# Patient Record
Sex: FEMALE | Race: OTHER | HISPANIC OR LATINO
[De-identification: names, ages, dates, MRNs, and addresses within clinical notes are randomized per-mention and may not be internally consistent; named-entity substitution may affect disease eponyms.]

---

## 2018-12-19 PROBLEM — Z00.00 ENCOUNTER FOR PREVENTIVE HEALTH EXAMINATION: Status: ACTIVE | Noted: 2018-12-19

## 2019-02-27 ENCOUNTER — RX RENEWAL (OUTPATIENT)
Age: 79
End: 2019-02-27

## 2019-03-01 ENCOUNTER — RX RENEWAL (OUTPATIENT)
Age: 79
End: 2019-03-01

## 2019-03-01 RX ORDER — CILOSTAZOL 100 MG/1
100 TABLET ORAL TWICE DAILY
Qty: 180 | Refills: 3 | Status: ACTIVE | COMMUNITY
Start: 2019-03-01 | End: 1900-01-01

## 2019-05-09 ENCOUNTER — APPOINTMENT (OUTPATIENT)
Dept: GERIATRICS | Facility: CLINIC | Age: 79
End: 2019-05-09

## 2021-10-06 ENCOUNTER — NON-APPOINTMENT (OUTPATIENT)
Age: 81
End: 2021-10-06

## 2021-10-06 ENCOUNTER — APPOINTMENT (OUTPATIENT)
Dept: BREAST CENTER | Facility: CLINIC | Age: 81
End: 2021-10-06
Payer: MEDICARE

## 2021-10-06 VITALS — OXYGEN SATURATION: 98 % | WEIGHT: 210 LBS | BODY MASS INDEX: 41.23 KG/M2 | HEIGHT: 60 IN | HEART RATE: 103 BPM

## 2021-10-06 DIAGNOSIS — Z78.9 OTHER SPECIFIED HEALTH STATUS: ICD-10-CM

## 2021-10-06 DIAGNOSIS — F17.200 NICOTINE DEPENDENCE, UNSPECIFIED, UNCOMPLICATED: ICD-10-CM

## 2021-10-06 DIAGNOSIS — Z86.79 PERSONAL HISTORY OF OTHER DISEASES OF THE CIRCULATORY SYSTEM: ICD-10-CM

## 2021-10-06 DIAGNOSIS — Z86.39 PERSONAL HISTORY OF OTHER ENDOCRINE, NUTRITIONAL AND METABOLIC DISEASE: ICD-10-CM

## 2021-10-06 DIAGNOSIS — R92.8 OTHER ABNORMAL AND INCONCLUSIVE FINDINGS ON DIAGNOSTIC IMAGING OF BREAST: ICD-10-CM

## 2021-10-06 DIAGNOSIS — Z87.39 PERSONAL HISTORY OF OTHER DISEASES OF THE MUSCULOSKELETAL SYSTEM AND CONNECTIVE TISSUE: ICD-10-CM

## 2021-10-06 DIAGNOSIS — Z80.6 FAMILY HISTORY OF LEUKEMIA: ICD-10-CM

## 2021-10-06 PROCEDURE — 99204 OFFICE O/P NEW MOD 45 MIN: CPT

## 2021-10-06 RX ORDER — AMLODIPINE BESYLATE 5 MG/1
TABLET ORAL
Refills: 0 | Status: ACTIVE | COMMUNITY

## 2021-10-06 RX ORDER — SPIRONOLACTONE 50 MG/1
TABLET ORAL
Refills: 0 | Status: ACTIVE | COMMUNITY

## 2021-10-06 RX ORDER — CILOSTAZOL 100 MG/1
100 TABLET ORAL TWICE DAILY
Qty: 180 | Refills: 3 | Status: DISCONTINUED | COMMUNITY
Start: 2019-02-27 | End: 2021-10-06

## 2021-10-06 RX ORDER — MULTIVIT-MIN/IRON/FOLIC ACID/K 18-600-40
CAPSULE ORAL
Refills: 0 | Status: ACTIVE | COMMUNITY

## 2021-10-06 RX ORDER — METOPROLOL SUCCINATE 200 MG/1
TABLET, EXTENDED RELEASE ORAL
Refills: 0 | Status: ACTIVE | COMMUNITY

## 2021-10-08 NOTE — PAST MEDICAL HISTORY
[Menarche Age ____] : age at menarche was [unfilled] [Menopause Age____] : age at menopause was [unfilled] [Approximately ___] : the LMP was approximately [unfilled] [Total Preg ___] : G[unfilled] [Live Births ___] : P[unfilled]  [Age At Live Birth ___] : Age at live birth: [unfilled] [History of Hormone Replacement Treatment] : has no history of hormone replacement treatment

## 2021-10-08 NOTE — HISTORY OF PRESENT ILLNESS
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 Right ultrasound-guided core biopsy revealed poorly differentiated ductal carcinoma that is ER/MI positive HER-2 negative with a Ki-67 of 90%. Patient denies any breast masses or nipple discharge. Her paternal grandmother breast cancer at age 55. An excisional biopsy in the left breast in 1995 that was benign. Patient has never taken hormone replacement therapy.

## 2021-10-18 ENCOUNTER — RESULT REVIEW (OUTPATIENT)
Age: 81
End: 2021-10-18

## 2021-10-20 ENCOUNTER — RESULT REVIEW (OUTPATIENT)
Age: 81
End: 2021-10-20

## 2021-10-21 ENCOUNTER — RESULT REVIEW (OUTPATIENT)
Age: 81
End: 2021-10-21

## 2021-10-21 ENCOUNTER — APPOINTMENT (OUTPATIENT)
Dept: BREAST CENTER | Facility: HOSPITAL | Age: 81
End: 2021-10-21

## 2021-10-29 ENCOUNTER — APPOINTMENT (OUTPATIENT)
Dept: BREAST CENTER | Facility: CLINIC | Age: 81
End: 2021-10-29
Payer: MEDICARE

## 2021-10-29 ENCOUNTER — RESULT REVIEW (OUTPATIENT)
Age: 81
End: 2021-10-29

## 2021-10-29 ENCOUNTER — APPOINTMENT (OUTPATIENT)
Dept: HEMATOLOGY ONCOLOGY | Facility: CLINIC | Age: 81
End: 2021-10-29
Payer: MEDICARE

## 2021-10-29 VITALS
DIASTOLIC BLOOD PRESSURE: 90 MMHG | SYSTOLIC BLOOD PRESSURE: 160 MMHG | TEMPERATURE: 97.5 F | HEART RATE: 97 BPM | BODY MASS INDEX: 41.03 KG/M2 | RESPIRATION RATE: 16 BRPM | HEIGHT: 60 IN | OXYGEN SATURATION: 96 % | WEIGHT: 209 LBS

## 2021-10-29 DIAGNOSIS — Z80.41 FAMILY HISTORY OF MALIGNANT NEOPLASM OF OVARY: ICD-10-CM

## 2021-10-29 DIAGNOSIS — Z80.3 FAMILY HISTORY OF MALIGNANT NEOPLASM OF BREAST: ICD-10-CM

## 2021-10-29 PROCEDURE — 99205 OFFICE O/P NEW HI 60 MIN: CPT | Mod: 25

## 2021-10-29 PROCEDURE — 99024 POSTOP FOLLOW-UP VISIT: CPT

## 2021-10-29 PROCEDURE — 36415 COLL VENOUS BLD VENIPUNCTURE: CPT

## 2021-10-29 RX ORDER — UBIDECARENONE 30 MG
CAPSULE ORAL
Refills: 0 | Status: ACTIVE | COMMUNITY
Start: 2021-10-29

## 2021-10-29 RX ORDER — FLUTICASONE PROPIONATE 44 UG/1
44 AEROSOL, METERED RESPIRATORY (INHALATION)
Refills: 0 | Status: ACTIVE | COMMUNITY
Start: 2021-10-29

## 2021-10-29 RX ORDER — ATORVASTATIN CALCIUM 20 MG/1
20 TABLET, FILM COATED ORAL
Refills: 0 | Status: ACTIVE | COMMUNITY
Start: 2021-10-29

## 2021-10-29 RX ORDER — ASPIRIN 81 MG/1
81 TABLET, CHEWABLE ORAL
Refills: 0 | Status: ACTIVE | COMMUNITY
Start: 2021-10-29

## 2021-10-29 RX ORDER — CALCIUM CARBONATE/VITAMIN D3 600 MG-20
600-800 TABLET ORAL
Refills: 0 | Status: ACTIVE | COMMUNITY
Start: 2021-10-29

## 2021-10-29 RX ORDER — CETIRIZINE HYDROCHLORIDE 10 MG/1
10 TABLET, FILM COATED ORAL
Refills: 0 | Status: ACTIVE | COMMUNITY
Start: 2021-10-29

## 2021-10-29 NOTE — HISTORY OF PRESENT ILLNESS
[de-identified] : Ms. 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  SHe gets mammogram every year and reports no other abnormalities until most recent one.\par \par She reports headaches, breathing aggravated by allergies, chronic constipation, uses cane for arthritis, unsteady when walking\par s/p multiple falls \par Her daughters report she has been "foggy" since surgery\par \par Age of menarche: 17\par Age of Menopause: 45 s/p hysterectomy for tumor removal (benign)\par OCP/HRT denies\par , Age 24 first birth\par \par Smoke: never\par ETOH: denies\par Illicit: Denies\par \par Personal HIstory:\par Last colonoscopy about 5 years, polyps, benign\par Last gyn ~2years ago\par Last DEXA >5 years ago\par \par Family History\par Paternal Grandmother Breast ca, dx age 55\par Maternal Aunt Leukemia 50's\par Maternal Cousin vaginal Ca, dx 65, her daughter just diagnosed with Breast Ca dx 40's

## 2021-10-29 NOTE — ASSESSMENT
[FreeTextEntry1] : # IDC, ER +/HI+/HER2-, (TNM Staging - Prognostically and pathologically IA - pT1c, pN0, Mx)\par We have reviewed the diagnosis, prognosis and natural history of ER/HI+, HER2- breast CA. \par We have reviewed the imaging and pathology personally and with the patient. \par Reviewed NCCN guidelines, she and her daughters express understanding\par \par We have discussed that the patients tumor is >0.5 cm, node negative and hormone receptor positive thus she qualifies for Oncotype DX, a genomic test that will provide a recurrence score (RS). We discussed the role of oncotype in determining whether there would be a benefit of adding chemotherapy in addition to hormonal therapy based on her risk of recurrence. Since she is  >49 yo would only recommend the addition of chemotherapy if her recurrence score is > 25. She expressed understanding and she would like to proceed.\par \par We did discuss despite her recurrence score I would recommend an aromatase inhibitor, Anastrozole, 1mg PO q day for a minimum of 5 years given that her tumor is ER/HI+ and she is post menopausal. We have reviewed the side effects of Anastrozole to include but are not limited to hot flashes, mood swings, arthralgias, bone pain, thinning of the bones including osteopenia/osteoporosis. If she experiences severe bone pain would recommend Tamoxifen. She will take this with Ca++ 1200 mg PO q day and Vit D 800 IU daily to protect her bone health. Will also check a baseline DEXA scan.\par

## 2021-10-29 NOTE — PAST MEDICAL HISTORY
[Menarche Age ____] : age at menarche was [unfilled] [Menopause Age____] : age at menopause was [unfilled] [History of Hormone Replacement Treatment] : has no history of hormone replacement treatment [Approximately ___] : the LMP was approximately [unfilled] [Total Preg ___] : G[unfilled] [Live Births ___] : P[unfilled]  [Age At Live Birth ___] : Age at live birth: [unfilled]

## 2021-10-29 NOTE — CONSULT LETTER
[Dear  ___] : Dear  [unfilled], [Consult Letter:] : I had the pleasure of evaluating your patient, [unfilled]. [Please see my note below.] : Please see my note below. [Consult Closing:] : Thank you very much for allowing me to participate in the care of this patient.  If you have any questions, please do not hesitate to contact me. [Sincerely,] : Sincerely, [FreeTextEntry3] : Vivi Read DO, FACAGUILAR, FACP\par Medical Oncology and Hematology\par [facility] [place]\par

## 2021-10-29 NOTE — HISTORY OF PRESENT ILLNESS
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  \par Negative margins\par \par Patient did well with surgery, pain under control.\par \par 81-year-old postmenopausal  woman with a family history of breast cancer presents after screening mammogram showed dense breast tissue but a follow-up ultrasound showed a 1.4 cm hypoechoic nodule in the periareolar region, 11:00 right breast. Right ultrasound-guided core biopsy revealed poorly differentiated ductal carcinoma that is ER/MT positive HER-2 negative with a Ki-67 of 90%. Patient denies any breast masses or nipple discharge. Her paternal grandmother breast cancer at age 55. An excisional biopsy in the left breast in 1995 that was benign. Patient has never taken hormone replacement therapy.

## 2021-10-29 NOTE — REASON FOR VISIT
[FreeTextEntry1] : Postoperative visit after right breast partial mastectomy and sentinel node biopsy

## 2021-11-08 ENCOUNTER — APPOINTMENT (OUTPATIENT)
Dept: RADIATION ONCOLOGY | Facility: CLINIC | Age: 81
End: 2021-11-08
Payer: MEDICARE

## 2021-11-08 ENCOUNTER — RESULT REVIEW (OUTPATIENT)
Age: 81
End: 2021-11-08

## 2021-11-08 VITALS
BODY MASS INDEX: 41.03 KG/M2 | SYSTOLIC BLOOD PRESSURE: 185 MMHG | OXYGEN SATURATION: 97 % | WEIGHT: 209 LBS | HEIGHT: 60 IN | HEART RATE: 90 BPM | DIASTOLIC BLOOD PRESSURE: 90 MMHG | RESPIRATION RATE: 18 BRPM

## 2021-11-08 PROCEDURE — 99204 OFFICE O/P NEW MOD 45 MIN: CPT | Mod: 25

## 2021-11-08 NOTE — VITALS
[Maximal Pain Intensity: 0/10] : 0/10 [Least Pain Intensity: 0/10] : 0/10 [90: Able to carry normal activity; minor signs or symptoms of disease.] : 90: Able to carry normal activity; minor signs or symptoms of disease.  [Date: ____________] : Patient's last distress assessment performed on [unfilled]. [3 - Distress Level] : Distress Level: 3 [Referred Patient  to social work for follow-up] : Patient was referred to social work for follow-up

## 2021-11-09 NOTE — DISEASE MANAGEMENT
[Pathological] : TNM Stage: p [I] : I [FreeTextEntry4] : Invasive Ductal Carcinoma of the Right Breast, ER/MO+ [TTNM] : 1a [NTNM] : 0 [MTNM] : 0

## 2021-11-09 NOTE — OB/GYN HISTORY
[Menopause Age: ____] : patient was [unfilled] years old at menopause [___] : Living: [unfilled] [History of Birth Control Pills] : Patient has no history of taking birth control pills [History of Hormone Replacement Therapy] : no history of hormone replacement therapy [Currently In Menopause] : not currently in menopause

## 2021-11-09 NOTE — LETTER CLOSING
[Consult Closing:] : Thank you for allowing me to participate in the care of this patient.  If you have any questions, please do not hesitate to contact me. [Sincerely yours,] : Sincerely yours, [FreeTextEntry3] : Fide Workman MD\par

## 2021-11-09 NOTE — HISTORY OF PRESENT ILLNESS
[FreeTextEntry1] : Ms. Palomino is a 81 year old female who was recently diagnosed with stage IA ER/MT+  [id number] invasive ductal carcinoma of the right breast cancer, s/p lumpectomy and sentinel lymph node biopsy  She presents with her daughter  today to discuss adjuvant radiation therapy. \par \par Ms. Palomino underwent a screening mammogram (21) that demonstrated dense breast tissue, no malignancy. She subsequently underwent a bilateral breast ultrasound (21) that revealed a 1.4 cm lobular hypoechoic nodule in the periareolar region at 11:00 in the right breast. She then underwent a right ultrasound-guided core biopsy on 21 and pathology revealed poorly differentiated invasive ductal carcinoma, grade 3, ER + 99% /MT + 20%/ HER-2 negative and Ki-67 of 90%. \par \par On 10/21/21, she underwent a lumpectomy and sentinel lymph node biopsy of the right breast performed by Dr. Dia. Surgical pathology revealed a 1.4 cm invasive ductal carcinoma, grade 3, DCIS was present, grade II-III. All margins were negative, closest 3 mm to inferior (invasive) and < 1 mm to the inferior for DCIS. . 0 / 1 lymph node was positive for metastatic carcinoma. Oncotype DX score and genetic testing results currently pending. \par \par Of note, Ms. Palomino had an excisional biopsy in the left breast in  that was benign. She is , menarche at 17, menopause at 44 s/p hysterectomy for fibroids, no BC or HRT. She has a family history of paternal grandmother, who had breast cancer at age 55.  Ms. Palomino and her daughter present for a consultation to discuss adjuvant radiation therapy.

## 2021-11-09 NOTE — PHYSICAL EXAM
[Normal] : oriented to person, place and time, the affect was normal, the mood was normal and not anxious [de-identified] : s/p lumpectomy and SLNS, steri-strips in place, no signs of infection present

## 2021-11-12 ENCOUNTER — APPOINTMENT (OUTPATIENT)
Dept: HEMATOLOGY ONCOLOGY | Facility: CLINIC | Age: 81
End: 2021-11-12
Payer: MEDICARE

## 2021-11-16 ENCOUNTER — APPOINTMENT (OUTPATIENT)
Dept: HEMATOLOGY ONCOLOGY | Facility: CLINIC | Age: 81
End: 2021-11-16
Payer: MEDICARE

## 2021-11-16 VITALS
SYSTOLIC BLOOD PRESSURE: 140 MMHG | TEMPERATURE: 98.3 F | HEIGHT: 60 IN | RESPIRATION RATE: 16 BRPM | BODY MASS INDEX: 41.03 KG/M2 | HEART RATE: 79 BPM | OXYGEN SATURATION: 97 % | WEIGHT: 209 LBS | DIASTOLIC BLOOD PRESSURE: 85 MMHG

## 2021-11-16 PROCEDURE — 36415 COLL VENOUS BLD VENIPUNCTURE: CPT

## 2021-11-16 PROCEDURE — 99215 OFFICE O/P EST HI 40 MIN: CPT | Mod: 25

## 2021-11-16 NOTE — HISTORY OF PRESENT ILLNESS
[de-identified] : Ms. 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  SHe gets mammogram every year and reports no other abnormalities until most recent one.\par \par She reports headaches, breathing aggravated by allergies, chronic constipation, uses cane for arthritis, unsteady when walking\par s/p multiple falls \par Her daughters report she has been "foggy" since surgery\par \par Age of menarche: 17\par Age of Menopause: 45 s/p hysterectomy for tumor removal (benign)\par OCP/HRT denies\par , Age 24 first birth\par \par Smoke: never\par ETOH: denies\par Illicit: Denies\par \par Personal HIstory:\par Last colonoscopy about 5 years, polyps, benign\par Last gyn ~2years ago\par Last DEXA >5 years ago\par \par Family History\par Paternal Grandmother Breast ca, dx age 55\par Maternal Aunt Leukemia 50's\par Maternal Cousin vaginal Ca, dx 65, her daughter just diagnosed with Breast Ca dx 40's [de-identified] : Patient is having a "sharp pain" in her right breast where the surgery was done. : Sylvia [id number]  \par Patient has no other complaints at this time. She had a bone density scan on Nov 8th, 2021.

## 2021-11-16 NOTE — ASSESSMENT
[FreeTextEntry1] : # IDC, ER +/[place]+/HER2-, (TNM Staging - Prognostically and pathologically IA - pT1c, pN0, Mx)\par We have reviewed the diagnosis, prognosis and natural history of ER/NY+, HER2- breast CA. \par We have reviewed the imaging and pathology personally and with the patient. \par Reviewed NCCN guidelines, she and her daughters express understanding\par \par oncotype 42 - recommend Taxotere 75 mg/m2 and cytoxan 600 mg/m2 q 21 days x 4 cycles followed by onpro. Reviewed side effects. She will discuss with her family more\par She will get a PORT if she wants chemo\par she has met with Dr. Workman to discuss role of RT\par Recommend Anastrozole, 1mg PO q day for a minimum of 5 years but likely longer given the oncotype score.\par If she experiences severe bone pain would recommend Tamoxifen. She will take this with Ca++ 1200 mg PO q day and Vit D 800 IU daily to protect her bone health. \par DEXA scan - A/P spine T score -0.9, Left femoral neck T score -0.4, L hip 1.3\par \par RTC in 2 weeks with cbc with diff, cmp, ESR/CRP\par \par

## 2021-11-16 NOTE — CONSULT LETTER
[FreeTextEntry3] : Vivi Read DO, FACAGUILAR, FACP\par Medical Oncology and Hematology\par [facility] [place]\par

## 2021-11-17 ENCOUNTER — NON-APPOINTMENT (OUTPATIENT)
Age: 81
End: 2021-11-17

## 2021-11-18 ENCOUNTER — TRANSCRIPTION ENCOUNTER (OUTPATIENT)
Age: 81
End: 2021-11-18

## 2021-11-29 ENCOUNTER — APPOINTMENT (OUTPATIENT)
Dept: HEMATOLOGY ONCOLOGY | Facility: CLINIC | Age: 81
End: 2021-11-29
Payer: MEDICARE

## 2021-12-07 ENCOUNTER — NON-APPOINTMENT (OUTPATIENT)
Age: 81
End: 2021-12-07

## 2021-12-07 VITALS
RESPIRATION RATE: 18 BRPM | OXYGEN SATURATION: 98 % | DIASTOLIC BLOOD PRESSURE: 99 MMHG | HEART RATE: 100 BPM | SYSTOLIC BLOOD PRESSURE: 170 MMHG

## 2021-12-07 NOTE — HISTORY OF PRESENT ILLNESS
[FreeTextEntry1] : Ms. Palomino is present for OTV today with her daughter. She is status post fraction 3 / 20 of radiation to the right breast. She reports she is doing well and tolerating treatment. She offers complaints of "inflammation" to the right upper arm/axillary area. She denies any discomfort or pain and is able to raise the arm without restriction. Possible side effects and skin care were reviewed. She is currently using aquaphor daily to the treated area.

## 2021-12-07 NOTE — PHYSICAL EXAM
[Axillary Lymph Nodes Enlarged Bilaterally] : axillary [Normal] : oriented to person, place and time, the affect was normal, the mood was normal and not anxious

## 2021-12-07 NOTE — DISEASE MANAGEMENT
[Pathological] : TNM Stage: p [I] : I [FreeTextEntry4] : Invasive Ductal Carcinoma of the Right Breast, ER/IL+ [TTNM] : 1a [NTNM] : 0 [MTNM] : 0 [de-identified] : 795 cGy [de-identified] : 5240 cGy [de-identified] : right breast

## 2021-12-14 ENCOUNTER — NON-APPOINTMENT (OUTPATIENT)
Age: 81
End: 2021-12-14

## 2021-12-14 VITALS
OXYGEN SATURATION: 98 % | RESPIRATION RATE: 18 BRPM | DIASTOLIC BLOOD PRESSURE: 80 MMHG | SYSTOLIC BLOOD PRESSURE: 164 MMHG | HEART RATE: 97 BPM | TEMPERATURE: 96.7 F

## 2021-12-15 NOTE — HISTORY OF PRESENT ILLNESS
[FreeTextEntry1] : Ms. Palomino is s/p fraction 8 / 20 of radiation to the right breast. She reports no issues at this time. No erythema or hyperpigmentation present. She continues to use moisturizer daily. She denies any fatigue at this time. She is tolerating treatment well.

## 2021-12-15 NOTE — DISEASE MANAGEMENT
[Pathological] : TNM Stage: p [I] : I [FreeTextEntry4] : Invasive Ductal Carcinoma of the Right Breast, ER/UT+ [TTNM] : 1a [NTNM] : 0 [MTNM] : 0 [de-identified] : 2120 cGy [de-identified] : 5240 cGy [de-identified] : right breast

## 2021-12-15 NOTE — PHYSICAL EXAM
[Axillary Lymph Nodes Enlarged Bilaterally] : axillary [Normal] : oriented to person, place and time, the affect was normal, the mood was normal and not anxious [de-identified] : No erythema of the right breast

## 2021-12-15 NOTE — REASON FOR VISIT
[Routine On-Treatment] : a routine on-treatment visit for [Breast Cancer] : breast cancer [Family Member] : family member [Patient Declined  Services] : - None: Patient declined  services

## 2021-12-21 ENCOUNTER — APPOINTMENT (OUTPATIENT)
Dept: HEMATOLOGY ONCOLOGY | Facility: CLINIC | Age: 81
End: 2021-12-21
Payer: MEDICARE

## 2021-12-21 ENCOUNTER — RESULT REVIEW (OUTPATIENT)
Age: 81
End: 2021-12-21

## 2021-12-21 ENCOUNTER — NON-APPOINTMENT (OUTPATIENT)
Age: 81
End: 2021-12-21

## 2021-12-21 VITALS
RESPIRATION RATE: 16 BRPM | DIASTOLIC BLOOD PRESSURE: 80 MMHG | HEART RATE: 89 BPM | TEMPERATURE: 97.3 F | OXYGEN SATURATION: 98 % | BODY MASS INDEX: 40.84 KG/M2 | HEIGHT: 60 IN | SYSTOLIC BLOOD PRESSURE: 140 MMHG | WEIGHT: 208 LBS

## 2021-12-21 PROCEDURE — 99214 OFFICE O/P EST MOD 30 MIN: CPT | Mod: 25

## 2021-12-21 PROCEDURE — 36415 COLL VENOUS BLD VENIPUNCTURE: CPT

## 2021-12-21 NOTE — HISTORY OF PRESENT ILLNESS
[FreeTextEntry1] : Ms. Palomino is present for OTV today. She has faint erythema present to the treated right breast. She denies any fatigue at this time.  She is tolerating treatment well.

## 2021-12-21 NOTE — HISTORY OF PRESENT ILLNESS
[de-identified] : Ms. 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  SHe gets mammogram every year and reports no other abnormalities until most recent one.\par \par She reports headaches, breathing aggravated by allergies, chronic constipation, uses cane for arthritis, unsteady when walking\par s/p multiple falls \par Her daughters report she has been "foggy" since surgery\par \par Age of menarche: 17\par Age of Menopause: 45 s/p hysterectomy for tumor removal (benign)\par OCP/HRT denies\par , Age 24 first birth\par \par Smoke: never\par ETOH: denies\par Illicit: Denies\par \par Personal HIstory:\par Last colonoscopy about 5 years, polyps, benign\par Last gyn ~2years ago\par Last DEXA >5 years ago\par \par Family History\par Paternal Grandmother Breast ca, dx age 55\par Maternal Aunt Leukemia 50's\par Maternal Cousin vaginal Ca, dx 65, her daughter just diagnosed with Breast Ca dx 40's [de-identified] : Patient seen and examined and here today for follow up\par Complains of fatigue\par currently receiving RT

## 2021-12-21 NOTE — ASSESSMENT
[FreeTextEntry1] : # IDC, ER +/[place]+/HER2-, (TNM Staging - Prognostically and pathologically IA - pT1c, pN0, Mx)\par We have reviewed the diagnosis, prognosis and natural history of ER/[place]+, HER2- breast CA. \par We have reviewed the imaging and pathology personally and with the patient. 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

## 2021-12-21 NOTE — CONSULT LETTER
[FreeTextEntry3] : Vivi Read DO, FACAGUILAR, FACP\par Medical Oncology and Hematology\par [facility] [place]\par

## 2021-12-21 NOTE — PHYSICAL EXAM
[Axillary Lymph Nodes Enlarged Bilaterally] : axillary [Normal] : oriented to person, place and time, the affect was normal, the mood was normal and not anxious [de-identified] : mild erythema of the right breast

## 2021-12-21 NOTE — DISEASE MANAGEMENT
[Pathological] : TNM Stage: p [I] : I [FreeTextEntry4] : Invasive Ductal Carcinoma of the Right Breast, ER/SC+ [TTNM] : 1a [NTNM] : 0 [MTNM] : 0 [de-identified] : 2120 cGy [de-identified] : 5240 cGy [de-identified] : right breast

## 2021-12-28 ENCOUNTER — NON-APPOINTMENT (OUTPATIENT)
Age: 81
End: 2021-12-28

## 2021-12-28 NOTE — DISEASE MANAGEMENT
[Pathological] : TNM Stage: p [I] : I [FreeTextEntry4] : Invasive Ductal Carcinoma of the Right Breast, ER/DC+ [TTNM] : 1a [NTNM] : 0 [MTNM] : 0 [de-identified] : 4780 cGy [de-identified] : 5240 cGy [de-identified] : right breast

## 2021-12-28 NOTE — REVIEW OF SYSTEMS
[Fatigue: Grade 0] : Fatigue: Grade 0 [Skin Hyperpigmentation: Grade 0] : Skin Hyperpigmentation: Grade 0 [Dermatitis Radiation: Grade 1 - Faint erythema or dry desquamation] : Dermatitis Radiation: Grade 1 - Faint erythema or dry desquamation

## 2021-12-28 NOTE — PHYSICAL EXAM
[Axillary Lymph Nodes Enlarged Bilaterally] : axillary [Normal] : oriented to person, place and time, the affect was normal, the mood was normal and not anxious [de-identified] : mild erythema/hyperpigmentation of the right breast, no desquamation

## 2021-12-28 NOTE — HISTORY OF PRESENT ILLNESS
[FreeTextEntry1] : Ms. Palomino is present for OTV and is status post fraction 16 / 20 to the right breast. She reports occasional sharp pain to right breast. Instructed to take ibuprofen. She also notes that the skin on her breast is starting to get more tan.

## 2021-12-28 NOTE — VITALS
[Maximal Pain Intensity: 0/10] : 0/10 [Least Pain Intensity: 0/10] : 0/10 [100: Normal, no complaints, no evidence of disease.] : 100: Normal, no complaints, no evidence of disease. [Pain Description/Quality: ___] : Pain description/quality: [unfilled] [Pain Duration: ___] : Pain duration: [unfilled] [Pain Location: ___] : Pain Location: [unfilled] [NoTreatment Scheduled] : no treatment scheduled [Pain Interferes with ADLs] : Pain does not interfere with activities of daily living

## 2022-01-21 ENCOUNTER — APPOINTMENT (OUTPATIENT)
Dept: BREAST CENTER | Facility: CLINIC | Age: 82
End: 2022-01-21
Payer: MEDICARE

## 2022-01-21 VITALS
SYSTOLIC BLOOD PRESSURE: 176 MMHG | BODY MASS INDEX: 38.28 KG/M2 | HEIGHT: 62 IN | DIASTOLIC BLOOD PRESSURE: 93 MMHG | HEART RATE: 99 BPM | WEIGHT: 208 LBS

## 2022-01-21 DIAGNOSIS — L58.9 RADIODERMATITIS, UNSPECIFIED: ICD-10-CM

## 2022-01-21 DIAGNOSIS — R92.2 INCONCLUSIVE MAMMOGRAM: ICD-10-CM

## 2022-01-21 PROCEDURE — 99214 OFFICE O/P EST MOD 30 MIN: CPT

## 2022-01-24 PROBLEM — L58.9 RADIATION DERMATITIS: Status: ACTIVE | Noted: 2022-01-04

## 2022-01-24 NOTE — HISTORY OF PRESENT ILLNESS
[FreeTextEntry1] : 10/21 right partial mastectomy with sentinel node biopsy\par Poorly differentiated ductal carcinoma, 1.4 cm, ER/AZ positive, HER-2 negative with a Ki-67 of 90%, 0/1 nodes.  \par Negative margins\par Radiation and anastrozole\par \par Patient denies any breast masses or bone pain.  Patient is complaining of some right arm restricted range of motion.\par \par 81-year-old postmenopausal  woman with a family history of breast cancer presents after screening mammogram showed dense breast tissue but a follow-up ultrasound showed a 1.4 cm hypoechoic nodule in the periareolar region, 11:00 right breast. Right ultrasound-guided core biopsy revealed poorly differentiated ductal carcinoma that is ER/AZ positive HER-2 negative with a Ki-67 of 90%. Patient denies any breast masses or nipple discharge. Her paternal grandmother breast cancer at age 55. An excisional biopsy in the left breast in 1995 that was benign. Patient has never taken hormone replacement therapy.

## 2022-01-24 NOTE — PHYSICAL EXAM
[No Supraclavicular Adenopathy] : no supraclavicular adenopathy [No Cervical Adenopathy] : no cervical adenopathy [Clear to Auscultation Bilat] : clear to auscultation bilaterally [Examined in the supine and seated position] : examined in the supine and seated position [Symmetrical] : symmetrical [No dominant masses] : no dominant masses in right breast  [No dominant masses] : no dominant masses left breast [No Nipple Retraction] : no left nipple retraction [No Nipple Discharge] : no left nipple discharge [No Axillary Lymphadenopathy] : no left axillary lymphadenopathy [No Rashes] : no rashes [de-identified] : Some radiation changes in the right breast with hyperpigmentation and blistering with a small amount of edema.  There is a right axillary cord that extends up the arm which is subtle.

## 2022-02-02 ENCOUNTER — APPOINTMENT (OUTPATIENT)
Dept: RADIATION ONCOLOGY | Facility: CLINIC | Age: 82
End: 2022-02-02
Payer: MEDICARE

## 2022-02-02 VITALS
BODY MASS INDEX: 38.28 KG/M2 | DIASTOLIC BLOOD PRESSURE: 78 MMHG | OXYGEN SATURATION: 98 % | HEART RATE: 99 BPM | HEIGHT: 62 IN | RESPIRATION RATE: 18 BRPM | WEIGHT: 208 LBS | TEMPERATURE: 97.5 F | SYSTOLIC BLOOD PRESSURE: 180 MMHG

## 2022-02-02 PROCEDURE — 99214 OFFICE O/P EST MOD 30 MIN: CPT

## 2022-03-28 DIAGNOSIS — I89.0 LYMPHEDEMA, NOT ELSEWHERE CLASSIFIED: ICD-10-CM

## 2022-04-01 ENCOUNTER — RESULT REVIEW (OUTPATIENT)
Age: 82
End: 2022-04-01

## 2022-04-01 ENCOUNTER — APPOINTMENT (OUTPATIENT)
Dept: HEMATOLOGY ONCOLOGY | Facility: CLINIC | Age: 82
End: 2022-04-01
Payer: MEDICARE

## 2022-04-01 VITALS
HEART RATE: 79 BPM | HEIGHT: 62 IN | DIASTOLIC BLOOD PRESSURE: 88 MMHG | OXYGEN SATURATION: 99 % | TEMPERATURE: 98.3 F | SYSTOLIC BLOOD PRESSURE: 140 MMHG | BODY MASS INDEX: 38.64 KG/M2 | RESPIRATION RATE: 16 BRPM | WEIGHT: 210 LBS

## 2022-04-01 DIAGNOSIS — R53.83 OTHER FATIGUE: ICD-10-CM

## 2022-04-01 DIAGNOSIS — R70.0 ELEVATED ERYTHROCYTE SEDIMENTATION RATE: ICD-10-CM

## 2022-04-01 DIAGNOSIS — Z85.3 PERSONAL HISTORY OF MALIGNANT NEOPLASM OF BREAST: ICD-10-CM

## 2022-04-01 DIAGNOSIS — R74.8 ABNORMAL LEVELS OF OTHER SERUM ENZYMES: ICD-10-CM

## 2022-04-01 PROCEDURE — 36415 COLL VENOUS BLD VENIPUNCTURE: CPT

## 2022-04-01 PROCEDURE — 99214 OFFICE O/P EST MOD 30 MIN: CPT | Mod: 25

## 2022-04-01 RX ORDER — SILVER SULFADIAZINE 10 MG/G
1 CREAM TOPICAL TWICE DAILY
Qty: 1 | Refills: 0 | Status: COMPLETED | COMMUNITY
Start: 2022-01-04 | End: 2022-04-01

## 2022-04-01 RX ORDER — ATORVASTATIN CALCIUM 80 MG/1
TABLET, FILM COATED ORAL
Refills: 0 | Status: COMPLETED | COMMUNITY
End: 2022-04-01

## 2022-04-01 RX ORDER — ASPIRIN 325 MG
TABLET ORAL
Refills: 0 | Status: COMPLETED | COMMUNITY
End: 2022-04-01

## 2022-04-01 NOTE — CONSULT LETTER
[FreeTextEntry3] : Vivi Read DO, FACAGUILAR, FACP\par Medical Oncology and Hematology\par [facility] [place]\par

## 2022-04-01 NOTE — ASSESSMENT
[FreeTextEntry1] : # IDC, ER +/IA+/HER2-, (TNM Staging - Prognostically and pathologically IA - pT1c, pN0, Mx)\par We have reviewed the diagnosis, prognosis and natural history of ER/IA+, HER2- breast CA. \par We have reviewed the imaging and pathology personally and with the patient. 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  it would require her for more frequent office visits to monitor her counts and side effects. patient's family will contact office if/when they make a decision. Next mammo/US summer 2022, ordered by Dr. Wade\par \par #arthritis\par arthritic pain likely aggravated by anastrazole\par \par #fatigue\par 4/1/2022 b12, fol, irons pending\par \par #elevated ESR\par monitor\par \par #elevated alk phos\par will continue to monitor\par \par Case and management discussed with Dr. kwong\par RTC in 3 months with cbc with diff, cmp, ESR/CRP, ca 27-29, b12/fol, irons\par \par

## 2022-04-01 NOTE — HISTORY OF PRESENT ILLNESS
[de-identified] : Ms. 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  SHe gets mammogram every year and reports no other abnormalities until most recent one.\par \par She reports headaches, breathing aggravated by allergies, chronic constipation, uses cane for arthritis, unsteady when walking\par s/p multiple falls \par Her daughters report she has been "foggy" since surgery\par \par Age of menarche: 17\par Age of Menopause: 45 s/p hysterectomy for tumor removal (benign)\par OCP/HRT denies\par , Age 24 first birth\par \par Smoke: never\par ETOH: denies\par Illicit: Denies\par \par Personal HIstory:\par Last colonoscopy about 5 years, polyps, benign\par Last gyn ~2years ago\par Last DEXA >5 years ago\par \par Family History\par Paternal Grandmother Breast ca, dx age 55\par Maternal Aunt Leukemia 50's\par Maternal Cousin vaginal Ca, dx 65, her daughter just diagnosed with Breast Ca dx 40's [de-identified] : Patient seen and examined and here today for follow up\par Attempt made to use  services over phone ID [phone], but patient having difficult time hearing.\par She reports extreme fatigue, worsening arthritis, and breast tenderness.\par She denies dizziness, lightheadedness, SOB, palpitations, nausea, vomiting, constipation, diarrhea, and bleeding.

## 2022-04-01 NOTE — REVIEW OF SYSTEMS
[Chest Pain] : no chest pain [Constipation] : no constipation [FreeTextEntry2] : review of systems completed, negative unless otherwise noted above

## 2022-04-21 ENCOUNTER — APPOINTMENT (OUTPATIENT)
Dept: RADIATION ONCOLOGY | Facility: CLINIC | Age: 82
End: 2022-04-21
Payer: MEDICARE

## 2022-04-21 VITALS
HEART RATE: 80 BPM | TEMPERATURE: 98.6 F | DIASTOLIC BLOOD PRESSURE: 84 MMHG | OXYGEN SATURATION: 99 % | RESPIRATION RATE: 18 BRPM | SYSTOLIC BLOOD PRESSURE: 185 MMHG

## 2022-04-21 PROCEDURE — 99214 OFFICE O/P EST MOD 30 MIN: CPT

## 2022-04-25 NOTE — REASON FOR VISIT
[Breast Cancer] : breast cancer [Routine Follow-Up] : routine follow-up visit for [Family Member] : family member [Patient Declined  Services] : - None: Patient declined  services [Interpreters_Relationshiptopatient] : Daughter [TWNoteComboBox1] : Mauritian

## 2022-04-25 NOTE — DISEASE MANAGEMENT
[Pathological] : TNM Stage: p [I] : I [FreeTextEntry4] : Invasive Ductal Carcinoma of the Right Breast, ER/[place]+ [TTNM] : 1a [NTNM] : 0 [MTNM] : 0

## 2022-04-25 NOTE — PHYSICAL EXAM
[Normal] : oriented to person, place and time, the affect was normal, the mood was normal and not anxious [de-identified] : s/p lumpectomy and SLNS, healed well

## 2022-04-25 NOTE — HISTORY OF PRESENT ILLNESS
[FreeTextEntry1] : Ms. Palomino is a 81 year old female diagnosed with pathological prognostic stage IB ER/SD+, [id number] invasive ductal carcinoma of the right breast cancer, s/p lumpectomy and sentinel lymph node biospy followed by adjuvant radiation therapy. She presents today with her daughter for her follow-up. \par \par Ms. Palomino underwent a screening mammogram (8/24/21) that demonstrated dense breast tissue, no malignancy. She subsequently underwent a bilateral breast ultrasound (9/1/21) that revealed a 1.4 cm lobular hypoechoic nodule in the periareolar region at 11:00 in the right breast. Right ultrasound-guided core (9/24/21) revealed poorly differentiated invasive ductal carcinoma, grade 3, ER + 99% /SD + 20%/ HER-2 negative and Ki-67 of 90%. \par \par On 10/21/21, she underwent a lumpectomy and sentinel lymph node biopsy of the right breast performed by Dr. Dia. Surgical pathology revealed a 1.4 cm invasive ductal carcinoma, grade 3, DCIS was present, grade II-III. All margins were negative, closest 3 mm to inferior (invasive) and < 1 mm to the inferior for DCIS. . 0 / 1 lymph node was positive for metastatic carcinoma. Oncotype DX score was 42 but she declined chemotherapy. \par \par Ms. Palomino completed radiation therapy to the right breast to a total dose of 52.4 Gy on 1/3/2022. She suffered expected acute side effects of hyperpigmentation. She reports she is taking Anastrozole with no noticeable side effects. She recently completed physical therapy for axillary cording. She reports intermittent fatigue / decreased energy levels. She is exercising daily on her treadmill at home. She is scheduled for follow-up mammogram and US in August 2022. She has recovered well post completion of radiation therapy.

## 2022-06-08 NOTE — HISTORY OF PRESENT ILLNESS
[FreeTextEntry1] : Ms. Palomino is a 81 year old female diagnosed with pathological prognostic stage IB ER/MS+, [id number] invasive ductal carcinoma of the right breast cancer, s/p lumpectomy and SLNS followed by adjuvant radiation therapy. She is present today for a post treatment evaluation. \par \par Ms. Palomino underwent a screening mammogram (8/24/21) that demonstrated dense breast tissue, no malignancy. She subsequently underwent a bilateral breast ultrasound (9/1/21) that revealed a 1.4 cm lobular hypoechoic nodule in the periareolar region at 11:00 in the right breast. Right ultrasound-guided core (9/24/21) revealed poorly differentiated invasive ductal carcinoma, grade 3, ER + 99% /MS + 20%/ HER-2 negative and Ki-67 of 90%. \par \par On 10/21/21, she underwent a lumpectomy and sentinel lymph node biopsy of the right breast performed by Dr. Dia. Surgical pathology revealed a 1.4 cm invasive ductal carcinoma, grade 3, DCIS was present, grade II-III. All margins were negative, closest 3 mm to inferior (invasive) and < 1 mm to the inferior for DCIS. . 0 / 1 lymph node was positive for metastatic carcinoma. Oncotype DX score was 42 but declined chemotherapy. \par \par She completed radiation therapy to the right breast to a total dose of 52.4 Gy on 1/3/2022. She suffered expected acute side effects of hyperpigmentation. She reports she is taking Anastrozole with no noticeable side effects. She has axillary cording and is working on scheduling physical therapy. She also reports sharp shooting intermittent pain to the treated breast. Ms. Palomino and her daughter are present today for her post treatment evaluation.

## 2022-06-08 NOTE — DISEASE MANAGEMENT
[Pathological] : TNM Stage: p [I] : I [FreeTextEntry4] : Invasive Ductal Carcinoma of the Right Breast, ER/[place]+ [TTNM] : 1a [NTNM] : 0 [MTNM] : 0

## 2022-06-08 NOTE — PHYSICAL EXAM
[Normal] : oriented to person, place and time, the affect was normal, the mood was normal and not anxious [de-identified] : s/p lumpectomy and SLNS, mild hyperpigmentation present to the left breast and axilla, axillary cording present

## 2022-07-01 ENCOUNTER — RESULT REVIEW (OUTPATIENT)
Age: 82
End: 2022-07-01

## 2022-07-01 ENCOUNTER — APPOINTMENT (OUTPATIENT)
Dept: HEMATOLOGY ONCOLOGY | Facility: CLINIC | Age: 82
End: 2022-07-01

## 2022-07-01 VITALS
SYSTOLIC BLOOD PRESSURE: 155 MMHG | DIASTOLIC BLOOD PRESSURE: 80 MMHG | HEART RATE: 89 BPM | RESPIRATION RATE: 18 BRPM | BODY MASS INDEX: 37.46 KG/M2 | OXYGEN SATURATION: 98 % | WEIGHT: 203.56 LBS | TEMPERATURE: 97.8 F | HEIGHT: 62 IN

## 2022-07-01 DIAGNOSIS — R30.9 PAINFUL MICTURITION, UNSPECIFIED: ICD-10-CM

## 2022-07-01 PROCEDURE — 99215 OFFICE O/P EST HI 40 MIN: CPT | Mod: 25

## 2022-07-01 PROCEDURE — 36415 COLL VENOUS BLD VENIPUNCTURE: CPT

## 2022-07-01 NOTE — PHYSICAL EXAM
[Restricted in physically strenuous activity but ambulatory and able to carry out work of a light or sedentary nature] : Status 1- Restricted in physically strenuous activity but ambulatory and able to carry out work of a light or sedentary nature, e.g., light house work, office work [Obese] : obese [Normal] : affect appropriate [de-identified] : hyperpigmentation to right breast and right chest wall

## 2022-07-01 NOTE — CONSULT LETTER
[Dear  ___] : Dear  [unfilled], [Consult Letter:] : I had the pleasure of evaluating your patient, [unfilled]. [Please see my note below.] : Please see my note below. [Consult Closing:] : Thank you very much for allowing me to participate in the care of this patient.  If you have any questions, please do not hesitate to contact me. [Sincerely,] : Sincerely, [FreeTextEntry3] : Vivi Read DO, FACAGUILAR, FACP\par Medical Oncology and Hematology\par [facility] [place]\par

## 2022-07-01 NOTE — HISTORY OF PRESENT ILLNESS
[de-identified] : Ms. Palomino presents to the office for newly diagnosed right breast cancer with daughters;  ID [id number].\par Right breast high grade invasive ductal carcinoma\par ER +, MI +Her 2 neg\par KI 67 90%\par s/p right partial mastectomy\par left sentinel lymph node dissection- negative\par \par Her daughters reports she had a benign mass in left breast 26 years ago.  SHe gets mammogram every year and reports no other abnormalities until most recent one.\par \par She reports headaches, breathing aggravated by allergies, chronic constipation, uses cane for arthritis, unsteady when walking\par s/p multiple falls \par Her daughters report she has been "foggy" since surgery\par \par Age of menarche: 17\par Age of Menopause: 45 s/p hysterectomy for tumor removal (benign)\par OCP/HRT denies\par , Age 24 first birth\par \par Smoke: never\par ETOH: denies\par Illicit: Denies\par \par Personal HIstory:\par Last colonoscopy about 5 years, polyps, benign\par Last gyn ~2years ago\par Last DEXA >5 years ago\par \par Family History\par Paternal Grandmother Breast ca, dx age 55\par Maternal Aunt Leukemia 50's\par Maternal Cousin vaginal Ca, dx 65, her daughter just diagnosed with Breast Ca dx 40's [de-identified] : Patient seen and examined and here today for follow up\par Accompanied by daughter.\par Complains of arthritis pain that has worsened\par Also complains of pain with urination

## 2022-07-01 NOTE — REVIEW OF SYSTEMS
[Negative] : Allergic/Immunologic [Chest Pain] : no chest pain [Constipation] : no constipation [FreeTextEntry2] : review of systems completed, negative unless otherwise noted above

## 2022-07-01 NOTE — ASSESSMENT
[FreeTextEntry1] : # IDC, ER +/OR+/HER2-, (TNM Staging - Prognostically and pathologically IA - pT1c, pN0, Mx)\par We have reviewed the diagnosis, prognosis and natural history of ER/OR+, HER2- breast CA. \par We have reviewed the imaging and pathology personally and with the patient. \par Reviewed NCCN guidelines, she and her daughters express understanding\par oncotype 42 - does not want chemo\par Receiving RT currently\par DEXA scan - A/P spine T score -0.9, Left femoral neck T score -0.4, L hip 1.3\par Will give anastrozole 1 mg PO, ca++ and vit D - reviewed side effects\par She does not want verzenio added\par 4/1/2022 vs reviewed, extensive discussion about verzenio; patient clarified that she does not want IV chemotherapy, but would consider adding an additional oral medication to prevent recurrence (RS >40).  it would require her for more frequent office visits to monitor her counts and side effects. patient's family will contact office if/when they make a decision. Next mammo/US summer 2022, ordered by Dr. Wade\par 7/1/22 - vs and labs reviewed. Mammo/sono in August with visit to Dr. Dia planned. She is having a lot of joint pains. I have advised her to hold the anastrozole for 2 weeks to see if her joint pains improve or if this is just her arthritis. If this is due to anastrozole we likely will need to change medications. Will consider exemestane or Tamoxifen.\par \par #arthritis\par arthritic pain likely aggravated by anastrazole\par 7/1/22 - I have advised her to hold the anastrozole for 2 weeks to see if her joint pains improve or if this is just her arthritis. If this is due to anastrozole we likely will need to change medications. Will consider exemestane or Tamoxifen.\par Tyelonol arthritis PRN\par \par #fatigue - resolved\par \par #elevated ESR\par monitor\par \par #elevated alk phos\par will continue to monitor\par \par #pain with urination\par UA that she had done by pcp was neg for infection\par given the names of urologists\par US of bladder ordered\par \par RTC in 3 months with cbc with diff, cmp, ESR/CRP, ca 27-29, b12/fol, irons\par \par

## 2022-07-18 ENCOUNTER — NON-APPOINTMENT (OUTPATIENT)
Age: 82
End: 2022-07-18

## 2022-07-18 RX ORDER — SITAGLIPTIN 100 MG/1
TABLET, FILM COATED ORAL
Refills: 0 | Status: COMPLETED | COMMUNITY
End: 2022-07-18

## 2022-07-18 RX ORDER — ANASTROZOLE TABLETS 1 MG/1
1 TABLET ORAL DAILY
Qty: 90 | Refills: 3 | Status: COMPLETED | COMMUNITY
Start: 2021-12-21 | End: 2022-07-18

## 2022-09-12 ENCOUNTER — APPOINTMENT (OUTPATIENT)
Dept: BREAST CENTER | Facility: CLINIC | Age: 82
End: 2022-09-12

## 2022-09-12 VITALS — BODY MASS INDEX: 39.27 KG/M2 | WEIGHT: 200 LBS | OXYGEN SATURATION: 98 % | HEIGHT: 60 IN

## 2022-09-12 PROCEDURE — 99213 OFFICE O/P EST LOW 20 MIN: CPT

## 2022-09-12 NOTE — PHYSICAL EXAM
[No Supraclavicular Adenopathy] : no supraclavicular adenopathy [No Cervical Adenopathy] : no cervical adenopathy [Clear to Auscultation Bilat] : clear to auscultation bilaterally [Examined in the supine and seated position] : examined in the supine and seated position [Symmetrical] : symmetrical [No dominant masses] : no dominant masses in right breast  [No dominant masses] : no dominant masses left breast [No Nipple Retraction] : no left nipple retraction [No Nipple Discharge] : no left nipple discharge [No Axillary Lymphadenopathy] : no left axillary lymphadenopathy [No Rashes] : no rashes [de-identified] : Some radiation changes in the right breast with hyperpigmentation and blistering with a small amount of edema.  There is a right axillary cord that extends up the arm which is subtle.

## 2022-09-12 NOTE — HISTORY OF PRESENT ILLNESS
[FreeTextEntry1] : 10/21 right partial mastectomy with sentinel node biopsy\par Poorly differentiated ductal carcinoma, 1.4 cm, ER/NV positive, HER-2 negative with a Ki-67 of 90%, 0/1 nodes.  \par Negative margins, Oncotype 42, no chemotherapy, refused\par Radiation and anastrozole\par \par Patient denies any breast masses or bone pain.  Patient is no longer complaining of some right arm restricted range of motion.  Recent mammogram and ultrasound was unremarkable.\par \par 81-year-old postmenopausal  woman with a family history of breast cancer presents after screening mammogram showed dense breast tissue but a follow-up ultrasound showed a 1.4 cm hypoechoic nodule in the periareolar region, 11:00 right breast. Right ultrasound-guided core biopsy revealed poorly differentiated ductal carcinoma that is ER/NV positive HER-2 negative with a Ki-67 of 90%. Patient denies any breast masses or nipple discharge. Her paternal grandmother breast cancer at age 55. An excisional biopsy in the left breast in 1995 that was benign. Patient has never taken hormone replacement therapy.

## 2022-09-16 ENCOUNTER — APPOINTMENT (OUTPATIENT)
Dept: BREAST CENTER | Facility: CLINIC | Age: 82
End: 2022-09-16

## 2022-10-07 ENCOUNTER — RESULT REVIEW (OUTPATIENT)
Age: 82
End: 2022-10-07

## 2022-10-07 ENCOUNTER — APPOINTMENT (OUTPATIENT)
Dept: HEMATOLOGY ONCOLOGY | Facility: CLINIC | Age: 82
End: 2022-10-07

## 2022-10-07 VITALS
RESPIRATION RATE: 16 BRPM | SYSTOLIC BLOOD PRESSURE: 161 MMHG | OXYGEN SATURATION: 97 % | TEMPERATURE: 97.2 F | HEART RATE: 98 BPM | WEIGHT: 207 LBS | HEIGHT: 60 IN | DIASTOLIC BLOOD PRESSURE: 88 MMHG | BODY MASS INDEX: 40.64 KG/M2

## 2022-10-07 PROCEDURE — 36415 COLL VENOUS BLD VENIPUNCTURE: CPT

## 2022-10-07 PROCEDURE — 99214 OFFICE O/P EST MOD 30 MIN: CPT | Mod: 25

## 2022-10-10 NOTE — PHYSICAL EXAM
[Restricted in physically strenuous activity but ambulatory and able to carry out work of a light or sedentary nature] : Status 1- Restricted in physically strenuous activity but ambulatory and able to carry out work of a light or sedentary nature, e.g., light house work, office work [Obese] : obese [Normal] : affect appropriate [de-identified] : hyperpigmentation to right breast and right chest wall fading

## 2022-10-10 NOTE — HISTORY OF PRESENT ILLNESS
[de-identified] : Ms. Palomino presents to the office for newly diagnosed right breast cancer with daughters;  ID [id number].\par Right breast high grade invasive ductal carcinoma\par ER +, ID +Her 2 neg\par KI 67 90%\par s/p right partial mastectomy\par left sentinel lymph node dissection- negative\par \par Her daughters reports she had a benign mass in left breast 26 years ago.  SHe gets mammogram every year and reports no other abnormalities until most recent one.\par \par She reports headaches, breathing aggravated by allergies, chronic constipation, uses cane for arthritis, unsteady when walking\par s/p multiple falls \par Her daughters report she has been "foggy" since surgery\par \par Age of menarche: 17\par Age of Menopause: 45 s/p hysterectomy for tumor removal (benign)\par OCP/HRT denies\par , Age 24 first birth\par \par Smoke: never\par ETOH: denies\par Illicit: Denies\par \par Personal HIstory:\par Last colonoscopy about 5 years, polyps, benign\par Last gyn ~2years ago\par Last DEXA >5 years ago\par \par Family History\par Paternal Grandmother Breast ca, dx age 55\par Maternal Aunt Leukemia 50's\par Maternal Cousin vaginal Ca, dx 65, her daughter just diagnosed with Breast Ca dx 40's [de-identified] : Patient seen and examined and here today for follow up with her daughter.\par She reports feeling so much better on exemestane compared to the anastrazole.She denies hot flashes and mood changes; she continues to have hand discomfort and knee pain.\par

## 2022-10-10 NOTE — CONSULT LETTER
[Dear  ___] : Dear  [unfilled], [Consult Letter:] : I had the pleasure of evaluating your patient, [unfilled]. [Please see my note below.] : Please see my note below. [Consult Closing:] : Thank you very much for allowing me to participate in the care of this patient.  If you have any questions, please do not hesitate to contact me. [Sincerely,] : Sincerely, [FreeTextEntry3] : Vivi Read DO, FACAGUILAR, FACP\par Medical Oncology and Hematology\par [facility] [place]\par

## 2022-10-10 NOTE — ASSESSMENT
[FreeTextEntry1] : # IDC, ER +/KY+/HER2-, (TNM Staging - Prognostically and pathologically IA - pT1c, pN0, Mx)\par We have reviewed the diagnosis, prognosis and natural history of ER/KY+, HER2- breast CA. \par We have reviewed the imaging and pathology personally and with the patient. \par Reviewed NCCN guidelines, she and her daughters express understanding\par oncotype 42 - does not want chemo\par Receiving RT currently\par DEXA scan - A/P spine T score -0.9, Left femoral neck T score -0.4, L hip 1.3\par Will give anastrozole 1 mg PO, ca++ and vit D - reviewed side effects\par She does not want verzenio added\par 4/1/2022 vs reviewed, extensive discussion about verzenio; patient clarified that she does not want IV chemotherapy, but would consider adding an additional oral medication to prevent recurrence (RS >40).  it would require her for more frequent office visits to monitor her counts and side effects. patient's family will contact office if/when they make a decision. Next mammo/US summer 2022, ordered by Dr. Wade\par 7/1/22 - vs and labs reviewed. Mammo/sono in August with visit to Dr. Dia planned. She is having a lot of joint pains. I have advised her to hold the anastrozole for 2 weeks to see if her joint pains improve or if this is just her arthritis. If this is due to anastrozole we likely will need to change medications. Will consider exemestane or Tamoxifen.\par 10/7/2022 vs and CBC reviewed; WBC 6.36, hgb 13.5, plt 225, to continue exemestane as patient tolerating it very well\par -Dr. Wade's note reviewed, next mammo/US due August 2023\par \par #arthritis\par arthritic pain likely aggravated by anastrazole\par 7/1/22 - I have advised her to hold the anastrozole for 2 weeks to see if her joint pains improve or if this is just her arthritis. If this is due to anastrozole we likely will need to change medications. Will consider exemestane or Tamoxifen.\par Tyelonol arthritis PRN\par 10/7/2022 recommended to see rheum, joint aches pain improved on exemestane, but her hands and knees remain bothersome; contact information given for rheum at [facility] and outside providers (Dr. Kapadia and Dr. Lundy)\par \par #bone density\par 10/7/2022 DEXA 11/2021 normal bone mineral density, next due 11/2023\par \par #elevated ESR\par monitor\par \par #elevated alk phos\par will continue to monitor\par \par #pain with urination\par UA that she had done by pcp was neg for infection\par given the names of urologists\par US of bladder ordered\par \par #genetic testing\par 10/7/2022 reviewed report; VUS RECQL4; told they can contact genetic counselor if they have any questions as this is associated with autosomal recessive disorders\par \par Case and management discussed with Dr. Read\par RTC in 3 months with cbc with diff, cmp, ESR/CRP, ca 27-29, b12/fol, irons\par \par

## 2022-10-20 ENCOUNTER — APPOINTMENT (OUTPATIENT)
Dept: RADIATION ONCOLOGY | Facility: CLINIC | Age: 82
End: 2022-10-20

## 2022-10-20 VITALS
OXYGEN SATURATION: 99 % | DIASTOLIC BLOOD PRESSURE: 81 MMHG | SYSTOLIC BLOOD PRESSURE: 173 MMHG | RESPIRATION RATE: 18 BRPM | HEART RATE: 98 BPM

## 2022-10-20 PROCEDURE — 99214 OFFICE O/P EST MOD 30 MIN: CPT

## 2022-10-25 NOTE — PHYSICAL EXAM
[Normal] : oriented to person, place and time, the affect was normal, the mood was normal and not anxious [de-identified] : s/p right breast lumpectomy and SLNS

## 2022-10-25 NOTE — DISEASE MANAGEMENT
[Pathological] : TNM Stage: p [I] : I [FreeTextEntry4] : Invasive Ductal Carcinoma of the Right Breast, ER/[place]+ [TTNM] : 1a [NTNM] : 0 [MTNM] : 0

## 2022-10-25 NOTE — REASON FOR VISIT
[Routine Follow-Up] : routine follow-up visit for [Breast Cancer] : breast cancer [Family Member] : family member [Patient Declined  Services] : - None: Patient declined  services [Interpreters_Relationshiptopatient] : Daughter [TWNoteComboBox1] : Vincentian

## 2022-10-25 NOTE — HISTORY OF PRESENT ILLNESS
[FreeTextEntry1] : Ms. Palomino is a 82 year old female diagnosed with pathological prognostic stage IB ER/RI+, [id number] invasive ductal carcinoma of the right breast cancer, s/p lumpectomy and sentinel lymph node biopsy followed by adjuvant radiation therapy. She presents today for her follow-up. \par \par Ms. Palomino underwent a screening mammogram (8/24/21) that demonstrated dense breast tissue, no malignancy. She subsequently underwent a bilateral breast ultrasound (9/1/21) that revealed a 1.4 cm lobular hypoechoic nodule in the periareolar region at 11:00 in the right breast. Right ultrasound-guided core (9/24/21) revealed poorly differentiated invasive ductal carcinoma, grade 3, ER + 99% /RI + 20%/ HER-2 negative and Ki-67 of 90%. \par \par On 10/21/21, she underwent a lumpectomy and sentinel lymph node biopsy of the right breast performed by Dr. Dia. Surgical pathology revealed a 1.4 cm invasive ductal carcinoma, grade 3, DCIS was present, grade II-III. All margins were negative, closest 3 mm to inferior (invasive) and < 1 mm to the inferior for DCIS. . 0 / 1 lymph node was positive for metastatic carcinoma. Oncotype DX score was 42 but she declined chemotherapy. \par \par Ms. Palomino completed radiation therapy to the right breast to a total dose of 52.4 Gy on 1/3/2022. She is taking exemestane, she was switched from Anastrazole secondary to body pain.  She completed physical therapy for axillary cording in April 2022. She reports intermittent fatigue / decreased energy levels.  She underwent follow-up mammogram and US in 8/16/22, which was negative.

## 2023-01-27 ENCOUNTER — RESULT REVIEW (OUTPATIENT)
Age: 83
End: 2023-01-27

## 2023-01-27 ENCOUNTER — APPOINTMENT (OUTPATIENT)
Dept: HEMATOLOGY ONCOLOGY | Facility: CLINIC | Age: 83
End: 2023-01-27
Payer: MEDICARE

## 2023-01-27 VITALS
BODY MASS INDEX: 29.24 KG/M2 | WEIGHT: 186.31 LBS | OXYGEN SATURATION: 98 % | DIASTOLIC BLOOD PRESSURE: 79 MMHG | TEMPERATURE: 97.8 F | HEART RATE: 95 BPM | HEIGHT: 67 IN | RESPIRATION RATE: 16 BRPM | SYSTOLIC BLOOD PRESSURE: 137 MMHG

## 2023-01-27 PROCEDURE — 99213 OFFICE O/P EST LOW 20 MIN: CPT | Mod: 25

## 2023-01-27 RX ORDER — LIRAGLUTIDE 6 MG/ML
INJECTION SUBCUTANEOUS
Refills: 0 | Status: COMPLETED | COMMUNITY
End: 2023-01-27

## 2023-01-27 NOTE — ASSESSMENT
[FreeTextEntry1] : # IDC, ER +/PA+/HER2-, (TNM Staging - Prognostically and pathologically IA - pT1c, pN0, Mx)\par We have reviewed the diagnosis, prognosis and natural history of ER/PA+, HER2- breast CA. \par We have reviewed the imaging and pathology personally and with the patient. \par Reviewed NCCN guidelines, she and her daughters express understanding\par oncotype 42 - does not want chemo\par Receiving RT currently\par DEXA scan - A/P spine T score -0.9, Left femoral neck T score -0.4, L hip 1.3\par Will give anastrozole 1 mg PO, ca++ and vit D - reviewed side effects\par She does not want verzenio added\par 4/1/2022 vs reviewed, extensive discussion about verzenio; patient clarified that she does not want IV chemotherapy, but would consider adding an additional oral medication to prevent recurrence (RS >40).  it would require her for more frequent office visits to monitor her counts and side effects. patient's family will contact office if/when they make a decision. Next mammo/US summer 2022, ordered by Dr. Wade\par 7/1/22 - vs and labs reviewed. Mammo/sono in August with visit to Dr. Dia planned. She is having a lot of joint pains. I have advised her to hold the anastrozole for 2 weeks to see if her joint pains improve or if this is just her arthritis. If this is due to anastrozole we likely will need to change medications. Will consider exemestane or Tamoxifen.\par 10/7/2022 vs and CBC reviewed; WBC 6.36, hgb 13.5, plt 225, to continue exemestane as patient tolerating it very well\par -Dr. Wade's note reviewed, next mammo/US due August 2023\par 1/27/2023 vs and CBC reveiwed; WBC 5.7, hgb 13.7, plt 226; continue exemestane\par \par #arthritis\par arthritic pain likely aggravated by anastrazole\par 7/1/22 - I have advised her to hold the anastrozole for 2 weeks to see if her joint pains improve or if this is just her arthritis. If this is due to anastrozole we likely will need to change medications. Will consider exemestane or Tamoxifen.\par Tyelonol arthritis PRN\par 10/7/2022 recommended to see rheum, joint aches pain improved on exemestane, but her hands and knees remain bothersome; contact information given for rheum at [facility] and outside providers (Dr. Kapadia and Dr. Lundy)\par 1/27/2023 reminded to see rheum\par \par #bone density\par 10/7/2022 DEXA 11/2021 normal bone mineral density, next due 11/2023\par \par #elevated ESR\par monitor\par \par #elevated alk phos\par will continue to monitor\par \par #pain with urination\par UA that she had done by pcp was neg for infection\par given the names of urologists\par US of bladder ordered\par \par #genetic testing\par 10/7/2022 reviewed report; VUS RECQL4; told they can contact genetic counselor if they have any questions as this is associated with autosomal recessive disorders\par \par Case and management discussed with Dr. Read\par RTC in 3 months with cbc with diff, cmp, ESR/CRP, ca 27-29, b12/fol, irons\par \par

## 2023-01-27 NOTE — HISTORY OF PRESENT ILLNESS
[de-identified] : Ms. Palomino presents to the office for newly diagnosed right breast cancer with daughters;  ID [id number].\par Right breast high grade invasive ductal carcinoma\par ER +, NV +Her 2 neg\par KI 67 90%\par s/p right partial mastectomy\par left sentinel lymph node dissection- negative\par \par Her daughters reports she had a benign mass in left breast 26 years ago.  SHe gets mammogram every year and reports no other abnormalities until most recent one.\par \par She reports headaches, breathing aggravated by allergies, chronic constipation, uses cane for arthritis, unsteady when walking\par s/p multiple falls \par Her daughters report she has been "foggy" since surgery\par \par Age of menarche: 17\par Age of Menopause: 45 s/p hysterectomy for tumor removal (benign)\par OCP/HRT denies\par , Age 24 first birth\par \par Smoke: never\par ETOH: denies\par Illicit: Denies\par \par Personal HIstory:\par Last colonoscopy about 5 years, polyps, benign\par Last gyn ~2years ago\par Last DEXA >5 years ago\par \par Family History\par Paternal Grandmother Breast ca, dx age 55\par Maternal Aunt Leukemia 50's\par Maternal Cousin vaginal Ca, dx 65, her daughter just diagnosed with Breast Ca dx 40's [de-identified] : Patient seen and examined and here today for follow up with her daughter.\par She reports feeling more energized on exemestane compared to the anastrazole.\par She denies hot flashes and mood changes; she continues to have hand discomfort and knee pain.\par

## 2023-01-27 NOTE — CONSULT LETTER
[Dear  ___] : Dear  [unfilled], [Consult Letter:] : I had the pleasure of evaluating your patient, [unfilled]. [Please see my note below.] : Please see my note below. [Consult Closing:] : Thank you very much for allowing me to participate in the care of this patient.  If you have any questions, please do not hesitate to contact me. [Sincerely,] : Sincerely, [FreeTextEntry3] : Vivi Read DO, FACAGUILAR, FACP\par Medical Oncology and Hematology\par [facility] [place]\par

## 2023-01-27 NOTE — PHYSICAL EXAM
[Restricted in physically strenuous activity but ambulatory and able to carry out work of a light or sedentary nature] : Status 1- Restricted in physically strenuous activity but ambulatory and able to carry out work of a light or sedentary nature, e.g., light house work, office work [Obese] : obese [Normal] : affect appropriate [de-identified] : hyperpigmentation to right breast and right chest wall fading

## 2023-03-15 ENCOUNTER — APPOINTMENT (OUTPATIENT)
Dept: RADIATION ONCOLOGY | Facility: CLINIC | Age: 83
End: 2023-03-15
Payer: MEDICARE

## 2023-03-28 ENCOUNTER — APPOINTMENT (OUTPATIENT)
Dept: BREAST CENTER | Facility: CLINIC | Age: 83
End: 2023-03-28
Payer: MEDICARE

## 2023-03-28 ENCOUNTER — NON-APPOINTMENT (OUTPATIENT)
Age: 83
End: 2023-03-28

## 2023-03-28 VITALS
HEIGHT: 67 IN | BODY MASS INDEX: 28.56 KG/M2 | SYSTOLIC BLOOD PRESSURE: 156 MMHG | WEIGHT: 182 LBS | HEART RATE: 76 BPM | OXYGEN SATURATION: 99 % | DIASTOLIC BLOOD PRESSURE: 96 MMHG

## 2023-03-28 PROCEDURE — 99213 OFFICE O/P EST LOW 20 MIN: CPT

## 2023-03-28 NOTE — HISTORY OF PRESENT ILLNESS
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  \par Negative margins, Oncotype 42, no chemotherapy, refused\par Radiation and anastrozole the exemestane\par \par Patient denies any breast masses or bone pain.  Patient is no longer complaining of some right arm restricted range of motion.  \par \par 81-year-old postmenopausal  woman with a family history of breast cancer presents after screening mammogram showed dense breast tissue but a follow-up ultrasound showed a 1.4 cm hypoechoic nodule in the periareolar region, 11:00 right breast. Right ultrasound-guided core biopsy revealed poorly differentiated ductal carcinoma that is ER/MT positive HER-2 negative with a Ki-67 of 90%. Patient denies any breast masses or nipple discharge. Her paternal grandmother breast cancer at age 55. An excisional biopsy in the left breast in 1995 that was benign. Patient has never taken hormone replacement therapy.

## 2023-04-05 ENCOUNTER — APPOINTMENT (OUTPATIENT)
Dept: RADIATION ONCOLOGY | Facility: CLINIC | Age: 83
End: 2023-04-05
Payer: MEDICARE

## 2023-04-05 VITALS
DIASTOLIC BLOOD PRESSURE: 86 MMHG | TEMPERATURE: 98 F | SYSTOLIC BLOOD PRESSURE: 168 MMHG | BODY MASS INDEX: 28.88 KG/M2 | HEIGHT: 67 IN | OXYGEN SATURATION: 98 % | RESPIRATION RATE: 16 BRPM | HEART RATE: 96 BPM | WEIGHT: 184 LBS

## 2023-04-05 PROCEDURE — 99214 OFFICE O/P EST MOD 30 MIN: CPT

## 2023-04-09 NOTE — DISEASE MANAGEMENT
[FreeTextEntry4] : Invasive Ductal Carcinoma of the Right Breast, ER/SC+ [TTNM] : 1a [NTNM] : 0 [MTNM] : 0

## 2023-04-09 NOTE — PHYSICAL EXAM
[Normal] : supple with no thyromegaly or masses appreciated [de-identified] : s/p right breast lumpectomy and SLNS

## 2023-04-09 NOTE — HISTORY OF PRESENT ILLNESS
[FreeTextEntry1] : Ms. Palomino is a 83 year old female diagnosed with pathological prognostic stage IB ER/VT+, [id number] invasive ductal carcinoma of the right breast cancer, s/p lumpectomy and sentinel lymph node biopsy followed by adjuvant radiation therapy. She presents today for her follow-up. \par \par Ms. Palomino underwent a screening mammogram (8/24/21) that demonstrated dense breast tissue, no malignancy. She subsequently underwent a bilateral breast ultrasound (9/1/21) that revealed a 1.4 cm lobular hypoechoic nodule in the periareolar region at 11:00 in the right breast. Right ultrasound-guided core (9/24/21) revealed poorly differentiated invasive ductal carcinoma, grade 3, ER + 99% /VT + 20%/ HER-2 negative and Ki-67 of 90%. \par \par On 10/21/21, she underwent a lumpectomy and sentinel lymph node biopsy of the right breast performed by Dr. Dia. Surgical pathology revealed a 1.4 cm invasive ductal carcinoma, grade 3, DCIS was present, grade II-III. All margins were negative, closest 3 mm to inferior (invasive) and < 1 mm to the inferior for DCIS. . 0 / 1 lymph node was positive for metastatic carcinoma. Oncotype DX score was 42 but she declined chemotherapy. \par \par Ms. Palomino completed radiation therapy to the right breast to a total dose of 52.4 Gy on 1/3/2022. She continues on Exemestane. She completed physical therapy for axillary cording in April 2022.  She underwent follow-up mammogram and US in 8/16/22, which was negative. Patient denies any pain, redness at the treatment site.  She reports no symptoms on Exemestane.  She had a recent follow up with  and .  Patient is scheduled for her follow up mammogram and ultrasound in August 2023.

## 2023-05-22 ENCOUNTER — APPOINTMENT (OUTPATIENT)
Dept: HEMATOLOGY ONCOLOGY | Facility: CLINIC | Age: 83
End: 2023-05-22

## 2023-06-26 ENCOUNTER — APPOINTMENT (OUTPATIENT)
Dept: HEMATOLOGY ONCOLOGY | Facility: CLINIC | Age: 83
End: 2023-06-26
Payer: MEDICARE

## 2023-06-30 ENCOUNTER — APPOINTMENT (OUTPATIENT)
Dept: HEMATOLOGY ONCOLOGY | Facility: CLINIC | Age: 83
End: 2023-06-30
Payer: MEDICARE

## 2023-06-30 ENCOUNTER — RESULT REVIEW (OUTPATIENT)
Age: 83
End: 2023-06-30

## 2023-06-30 VITALS
OXYGEN SATURATION: 98 % | RESPIRATION RATE: 16 BRPM | HEIGHT: 67 IN | HEART RATE: 93 BPM | DIASTOLIC BLOOD PRESSURE: 82 MMHG | WEIGHT: 180 LBS | SYSTOLIC BLOOD PRESSURE: 153 MMHG | BODY MASS INDEX: 28.25 KG/M2 | TEMPERATURE: 97.3 F

## 2023-06-30 PROCEDURE — 99214 OFFICE O/P EST MOD 30 MIN: CPT | Mod: 25

## 2023-06-30 NOTE — ASSESSMENT
[FreeTextEntry1] : # IDC, ER +/NE+/HER2-, (TNM Staging - Prognostically and pathologically IA - pT1c, pN0, Mx)\par We have reviewed the diagnosis, prognosis and natural history of ER/NE+, HER2- breast CA. \par We have reviewed the imaging and pathology personally and with the patient. \par Reviewed NCCN guidelines, she and her daughters express understanding\par oncotype 42 - does not want chemo\par Receiving RT currently\par DEXA scan - A/P spine T score -0.9, Left femoral neck T score -0.4, L hip 1.3\par Will give anastrozole 1 mg PO, ca++ and vit D - reviewed side effects\par She does not want verzenio added\par 4/1/2022 vs reviewed, extensive discussion about verzenio; patient clarified that she does not want IV chemotherapy, but would consider adding an additional oral medication to prevent recurrence (RS >40).  it would require her for more frequent office visits to monitor her counts and side effects. patient's family will contact office if/when they make a decision. Next mammo/US summer 2022, ordered by Dr. Wade\par 7/1/22 - vs and labs reviewed. Mammo/sono in August with visit to Dr. Dia planned. She is having a lot of joint pains. I have advised her to hold the anastrozole for 2 weeks to see if her joint pains improve or if this is just her arthritis. If this is due to anastrozole we likely will need to change medications. Will consider exemestane or Tamoxifen.\par 10/7/2022 vs and CBC reviewed; WBC 6.36, hgb 13.5, plt 225, to continue exemestane as patient tolerating it very well\par -Dr. Wade's note reviewed, next mammo/US due August 2023\par 1/27/2023 vs and CBC reveiwed; WBC 5.7, hgb 13.7, plt 226; continue exemestane\par 6/30/23 vs reviewed. labs drawn in office today. wbc 5.08, hgb 13.8, plts 243. CMP pending.continue exemestane. Mammo/sono August 2023\par \par \par #arthritis\par arthritic pain likely aggravated by anastrazole\par improved since switching to exemestane\par \par #bone density\par DEXA 11/2021 normal bone mineral density, next due 11/2023\par \par #elevated ESR\par monitor\par \par #genetic testing\par reviewed report; VUS RECQL4; told they can contact genetic counselor if they have any questions as this is associated with autosomal recessive disorders\par \par RTC in 6 months with cbc with diff, cmp, ESR/CRP, ca 27-29, b12/fol, irons\par \par

## 2023-06-30 NOTE — HISTORY OF PRESENT ILLNESS
[de-identified] : Ms. Palomino presents to the office for newly diagnosed right breast cancer with daughters;  ID [id number].\par Right breast high grade invasive ductal carcinoma\par ER +, [place] +Her 2 neg\par KI 67 90%\par s/p right partial mastectomy\par left sentinel lymph node dissection- negative\par \par Her daughters reports she had a benign mass in left breast 26 years ago.  SHe gets mammogram every year and reports no other abnormalities until most recent one.\par \par She reports headaches, breathing aggravated by allergies, chronic constipation, uses cane for arthritis, unsteady when walking\par s/p multiple falls \par Her daughters report she has been "foggy" since surgery\par \par Age of menarche: 17\par Age of Menopause: 45 s/p hysterectomy for tumor removal (benign)\par OCP/HRT denies\par , Age 24 first birth\par \par Smoke: never\par ETOH: denies\par Illicit: Denies\par \par Personal HIstory:\par Last colonoscopy about 5 years, polyps, benign\par Last gyn ~2years ago\par Last DEXA >5 years ago\par \par Family History\par Paternal Grandmother Breast ca, dx age 55\par Maternal Aunt Leukemia 50's\par Maternal Cousin vaginal Ca, dx 65, her daughter just diagnosed with Breast Ca dx 40's [de-identified] : Patient seen and examined and here today for follow up with her daughter. Reports feeling good on the exemestane, no side-effects. Overall feeling well besides pain in fingers.\par .\par

## 2023-06-30 NOTE — CONSULT LETTER
[FreeTextEntry3] : Vivi Read DO, FACAGUILAR, FACP\par Medical Oncology and Hematology\par [facility] [place]\par

## 2023-09-26 ENCOUNTER — APPOINTMENT (OUTPATIENT)
Dept: BREAST CENTER | Facility: CLINIC | Age: 83
End: 2023-09-26
Payer: MEDICARE

## 2023-09-26 VITALS
WEIGHT: 177.47 LBS | DIASTOLIC BLOOD PRESSURE: 96 MMHG | OXYGEN SATURATION: 96 % | BODY MASS INDEX: 34.84 KG/M2 | HEART RATE: 89 BPM | SYSTOLIC BLOOD PRESSURE: 152 MMHG | HEIGHT: 60 IN

## 2023-09-26 PROCEDURE — 99213 OFFICE O/P EST LOW 20 MIN: CPT

## 2023-10-05 ENCOUNTER — APPOINTMENT (OUTPATIENT)
Dept: RADIATION ONCOLOGY | Facility: CLINIC | Age: 83
End: 2023-10-05
Payer: MEDICARE

## 2023-10-05 VITALS
OXYGEN SATURATION: 98 % | RESPIRATION RATE: 18 BRPM | HEART RATE: 98 BPM | SYSTOLIC BLOOD PRESSURE: 173 MMHG | DIASTOLIC BLOOD PRESSURE: 91 MMHG

## 2023-10-05 PROCEDURE — 99214 OFFICE O/P EST MOD 30 MIN: CPT

## 2023-10-09 ENCOUNTER — NON-APPOINTMENT (OUTPATIENT)
Age: 83
End: 2023-10-09

## 2023-12-27 ENCOUNTER — RESULT REVIEW (OUTPATIENT)
Age: 83
End: 2023-12-27

## 2023-12-27 ENCOUNTER — APPOINTMENT (OUTPATIENT)
Dept: HEMATOLOGY ONCOLOGY | Facility: CLINIC | Age: 83
End: 2023-12-27
Payer: MEDICARE

## 2023-12-27 VITALS
OXYGEN SATURATION: 95 % | WEIGHT: 178 LBS | BODY MASS INDEX: 34.95 KG/M2 | DIASTOLIC BLOOD PRESSURE: 82 MMHG | HEIGHT: 60 IN | RESPIRATION RATE: 16 BRPM | HEART RATE: 78 BPM | SYSTOLIC BLOOD PRESSURE: 157 MMHG | TEMPERATURE: 96.9 F

## 2023-12-27 DIAGNOSIS — M19.90 UNSPECIFIED OSTEOARTHRITIS, UNSPECIFIED SITE: ICD-10-CM

## 2023-12-27 DIAGNOSIS — C50.919 MALIGNANT NEOPLASM OF UNSPECIFIED SITE OF UNSPECIFIED FEMALE BREAST: ICD-10-CM

## 2023-12-27 PROCEDURE — 99214 OFFICE O/P EST MOD 30 MIN: CPT

## 2023-12-27 RX ORDER — CYANOCOBALAMIN (VITAMIN B-12) 2500 MCG
5000 TABLET, SUBLINGUAL SUBLINGUAL
Refills: 0 | Status: ACTIVE | COMMUNITY

## 2023-12-27 NOTE — HISTORY OF PRESENT ILLNESS
[de-identified] : Ms. Palomino presents to the office for newly diagnosed right breast cancer with daughters;  ID [id number].\par  Right breast high grade invasive ductal carcinoma\par  ER +, [place] +Her 2 neg\par  KI 67 90%\par  s/p right partial mastectomy\par  left sentinel lymph node dissection- negative\par  \par  Her daughters reports she had a benign mass in left breast 26 years ago.  SHe gets mammogram every year and reports no other abnormalities until most recent one.\par  \par  She reports headaches, breathing aggravated by allergies, chronic constipation, uses cane for arthritis, unsteady when walking\par  s/p multiple falls \par  Her daughters report she has been "foggy" since surgery\par  \par  Age of menarche: 17\par  Age of Menopause: 45 s/p hysterectomy for tumor removal (benign)\par  OCP/HRT denies\par  , Age 24 first birth\par  \par  Smoke: never\par  ETOH: denies\par  Illicit: Denies\par  \par  Personal HIstory:\par  Last colonoscopy about 5 years, polyps, benign\par  Last gyn ~2years ago\par  Last DEXA >5 years ago\par  \par  Family History\par  Paternal Grandmother Breast ca, dx age 55\par  Maternal Aunt Leukemia 50's\par  Maternal Cousin vaginal Ca, dx 65, her daughter just diagnosed with Breast Ca dx 40's [de-identified] : Patient seen and examined and here today for follow up with her daughter. Reports feeling well besides pain in fingers. Tolerating Exemestane Has bone density test scheduled for March at [facility] saw PCP today and said that ÓSCAR came back positive and recommends pt to rheumatologist .

## 2023-12-27 NOTE — ASSESSMENT
[Designated Health Care Proxy] : Designated Health Care Proxy [Name: ___] : Name: [unfilled] [Relationship: ___] : Relationship: [unfilled] [Full Code] : full code [Last Verification Date: ___] : Last Verification Date: [unfilled] [FreeTextEntry1] : # IDC, ER +/OH+/HER2-, (TNM Staging - Prognostically and pathologically IA - pT1c, pN0, Mx) We have reviewed the diagnosis, prognosis and natural history of ER/OH+, HER2- breast CA. We have reviewed the imaging and pathology personally and with the patient. Reviewed NCCN guidelines, she and her daughters express understanding oncotype 42 - does not want chemo Receiving RT currently DEXA scan - A/P spine T score -0.9, Left femoral neck T score -0.4, L hip 1.3 Changed from anastrozole 1 mg PO to exemestane, ca++ and vit D - reviewed side effects She does not want verzenio added mammo/US due August 2023 - reviewed repeat 8/24 reviewed notes from Dr. Workman and Dr. Mason. Continue follow up with them reviewed labs from today. labs drawn in office today. cbc wnl.   #arthritis arthritic pain likely aggravated by exemestane but does have + ÓSCAR names of rheumatologists given  #bone density DEXA 11/2021 normal bone mineral density, next due 11/2023 - needs to be done  #elevated ESR monitor. decreased plan to see rheumatology  #genetic testing reviewed report; VUS RECQL4; told they can contact genetic counselor if they have any questions as this is associated with autosomal recessive disorders  RTC in 6 months with cbc with diff, cmp, ESR/CRP, ca 27-29, b12/fol, irons

## 2023-12-27 NOTE — REVIEW OF SYSTEMS
[Chest Pain] : no chest pain [Constipation] : no constipation [Negative] : Allergic/Immunologic [FreeTextEntry2] : review of systems completed, negative unless otherwise noted above [Diarrhea: Grade 0] : Diarrhea: Grade 0 [Joint Pain] : joint pain [Joint Stiffness] : joint stiffness [FreeTextEntry9] : joint pain and stiffness in both hands and knees that has gotten worse

## 2023-12-27 NOTE — CONSULT LETTER
[Dear  ___] : Dear  [unfilled], [Consult Letter:] : I had the pleasure of evaluating your patient, [unfilled]. [Please see my note below.] : Please see my note below. [Consult Closing:] : Thank you very much for allowing me to participate in the care of this patient.  If you have any questions, please do not hesitate to contact me. [Sincerely,] : Sincerely, [FreeTextEntry3] : Vivi Read DO, FACAGUILAR, FACP\par  Medical Oncology and Hematology\par  [facility] [place]\par

## 2023-12-27 NOTE — PHYSICAL EXAM
[Restricted in physically strenuous activity but ambulatory and able to carry out work of a light or sedentary nature] : Status 1- Restricted in physically strenuous activity but ambulatory and able to carry out work of a light or sedentary nature, e.g., light house work, office work [Obese] : obese [Normal] : affect appropriate [de-identified] : hyperpigmentation to right breast and right chest wall fading

## 2024-03-12 ENCOUNTER — APPOINTMENT (OUTPATIENT)
Dept: BREAST CENTER | Facility: CLINIC | Age: 84
End: 2024-03-12
Payer: MEDICARE

## 2024-03-12 VITALS
HEART RATE: 98 BPM | SYSTOLIC BLOOD PRESSURE: 147 MMHG | DIASTOLIC BLOOD PRESSURE: 78 MMHG | OXYGEN SATURATION: 99 % | BODY MASS INDEX: 34.36 KG/M2 | HEIGHT: 60 IN | WEIGHT: 175 LBS

## 2024-03-12 DIAGNOSIS — Z85.3 PERSONAL HISTORY OF MALIGNANT NEOPLASM OF BREAST: ICD-10-CM

## 2024-03-12 DIAGNOSIS — Z80.3 FAMILY HISTORY OF MALIGNANT NEOPLASM OF BREAST: ICD-10-CM

## 2024-03-12 PROCEDURE — 99213 OFFICE O/P EST LOW 20 MIN: CPT

## 2024-03-12 NOTE — HISTORY OF PRESENT ILLNESS
[FreeTextEntry1] : 10/21 right partial mastectomy with sentinel node biopsy Poorly differentiated ductal carcinoma, 1.4 cm, ER/KS positive, HER-2 negative with a Ki-67 of 90%, 0/1 nodes.   Negative margins, Oncotype 42, no chemotherapy, refused [id number], Stage 1a Radiation and anastrozole and then exemestane  Patient denies any breast masses or bone pain.  Patient does come in complaining of some strange feelings in the left breast she describes as itchiness but on the inside.   81-year-old postmenopausal  woman with a family history of breast cancer presents after screening mammogram showed dense breast tissue but a follow-up ultrasound showed a 1.4 cm hypoechoic nodule in the periareolar region, 11:00 right breast. Right ultrasound-guided core biopsy revealed poorly differentiated ductal carcinoma that is ER/KS positive HER-2 negative with a Ki-67 of 90%. Patient denies any breast masses or nipple discharge. Her paternal grandmother breast cancer at age 55. An excisional biopsy in the left breast in 1995 that was benign. Patient has never taken hormone replacement therapy.

## 2024-03-12 NOTE — PAST MEDICAL HISTORY
[Menarche Age ____] : age at menarche was [unfilled] [Menopause Age____] : age at menopause was [unfilled] [History of Hormone Replacement Treatment] : has no history of hormone replacement treatment [Total Preg ___] : G[unfilled] [Approximately ___] : the LMP was approximately [unfilled] [Age At Live Birth ___] : Age at live birth: [unfilled] [Live Births ___] : P[unfilled]

## 2024-03-12 NOTE — PHYSICAL EXAM
[No Supraclavicular Adenopathy] : no supraclavicular adenopathy [No Cervical Adenopathy] : no cervical adenopathy [Clear to Auscultation Bilat] : clear to auscultation bilaterally [Symmetrical] : symmetrical [Examined in the supine and seated position] : examined in the supine and seated position [No dominant masses] : no dominant masses in right breast  [No dominant masses] : no dominant masses left breast [No Nipple Retraction] : no left nipple retraction [No Nipple Discharge] : no left nipple discharge [No Axillary Lymphadenopathy] : no left axillary lymphadenopathy [No Rashes] : no rashes

## 2024-03-12 NOTE — REVIEW OF SYSTEMS
HISTORY OF PRESENT ILLNESS 
Lorenzo Griffiths is a 50 y.o. female. Pt. comes in for f/u. Has multiple medical problems. BP is stable. Continues to smoke daily. Has chronic BUNN. Uses albuterol as needed. History of meningioma. Followed by neurosurgery. She is obese. Reports having stress at work. Declines flu shot. Reports compliance with medications and diet. Med list and most recent labs/studies reviewed with pt. Trying to be active physically but not losing weight. Due for repeat labs. Reports no other new c/o. HPI Review of Systems Constitutional: Negative. HENT: Negative. Eyes: Negative for blurred vision. Respiratory: Positive for shortness of breath. Cardiovascular: Negative for chest pain and leg swelling. Gastrointestinal: Negative for abdominal pain. Genitourinary: Negative for dysuria. Musculoskeletal: Negative for back pain, falls and joint pain. Skin: Negative. Neurological: Negative for dizziness, sensory change, focal weakness and headaches. Psychiatric/Behavioral: Negative for depression. The patient is not nervous/anxious and does not have insomnia. All other systems reviewed and are negative. Physical Exam  
Constitutional: She is oriented to person, place, and time. She appears well-developed and well-nourished. No distress. obese HENT:  
Head: Normocephalic and atraumatic. Mouth/Throat: Oropharynx is clear and moist.  
Eyes: Conjunctivae are normal.  
Neck: Normal range of motion. Neck supple. No JVD present. No thyromegaly present. Cardiovascular: Normal rate, regular rhythm, normal heart sounds and intact distal pulses. No murmur heard. Pulmonary/Chest: Effort normal and breath sounds normal. No respiratory distress. She has no wheezes. She has no rales. Abdominal: Soft. Bowel sounds are normal. She exhibits no distension. Musculoskeletal: She exhibits no edema or tenderness. R shoulder lipoma Neurological: She is alert and oriented to person, place, and time. Coordination normal.  
Skin: Skin is warm and dry. No rash noted. Psychiatric: She has a normal mood and affect. Her behavior is normal.  
Nursing note and vitals reviewed. ASSESSMENT and PLAN Diagnoses and all orders for this visit: 1. Essential hypertension -     LIPID PANEL 
-     METABOLIC PANEL, COMPREHENSIVE 
-     CBC W/O DIFF 
-     TSH 3RD GENERATION 2. Hypercholesterolemia -     LIPID PANEL 
-     METABOLIC PANEL, COMPREHENSIVE 
-     CBC W/O DIFF 
-     TSH 3RD GENERATION 3. Tobacco dependence 4. H/O meningioma of the brain 5. Obesity (BMI 30.0-34.9) 6. Breast cancer screening by mammogram 
-     [facility] MAMMO BI SCREENING INCL CAD; Future 7. Colon cancer screening 
-     REFERRAL TO COLON AND RECTAL SURGERY Follow-up Disposition: 
Return in about 6 months (around 6/28/2019). lab results and schedule of future lab studies reviewed with patient 
reviewed diet, exercise and weight control 
reviewed medications and side effects in detail F/u with other MD's as scheduled Advised patient to quit smoking [Negative] : Heme/Lymph

## 2024-03-22 ENCOUNTER — NON-APPOINTMENT (OUTPATIENT)
Age: 84
End: 2024-03-22

## 2024-04-11 ENCOUNTER — APPOINTMENT (OUTPATIENT)
Dept: RADIATION ONCOLOGY | Facility: CLINIC | Age: 84
End: 2024-04-11
Payer: MEDICARE

## 2024-04-11 VITALS
WEIGHT: 178 LBS | SYSTOLIC BLOOD PRESSURE: 156 MMHG | HEART RATE: 98 BPM | HEIGHT: 60 IN | OXYGEN SATURATION: 99 % | RESPIRATION RATE: 18 BRPM | DIASTOLIC BLOOD PRESSURE: 93 MMHG | BODY MASS INDEX: 34.95 KG/M2

## 2024-04-11 PROCEDURE — 99214 OFFICE O/P EST MOD 30 MIN: CPT

## 2024-04-14 NOTE — HISTORY OF PRESENT ILLNESS
[FreeTextEntry1] : Ms. Palomino is an 83-year-old female diagnosed with ER/WI+ invasive ductal carcinoma of the right breast, s/p lumpectomy and sentinel lymph node biopsy followed by adjuvant radiation therapy. She is present today for follow-up.   Ms. Palomino underwent a screening mammogram (8/24/21) that demonstrated dense breast tissue, no malignancy. She subsequently underwent a bilateral breast ultrasound (9/1/21) that revealed a 1.4 cm lobular hypoechoic nodule in the periareolar region at 11:00 in the right breast. Right ultrasound-guided core (9/24/21) revealed poorly differentiated invasive ductal carcinoma, grade 3, ER + 99% /WI + 20%/ HER-2 negative and Ki-67 of 90%.  On 10/21/21, she underwent a lumpectomy and sentinel lymph node biopsy of the right breast performed by Dr. Dia. Surgical pathology revealed a 1.4 cm invasive ductal carcinoma, grade 3, DCIS was present, grade II-III. All margins were negative, closest 3 mm to inferior (invasive) and < 1 mm to the inferior for DCIS. 0 / 1 lymph node positive for metastatic carcinoma. Oncotype DX score was 42 but she declined chemotherapy.  Ms. Palomino was treated with and completed adjuvant radiation therapy to the right breast to a total dose of 52.4 Gy on 1/3/2022.  Her most recent mammogram was completed on 8/18/23 with benign-appearing results.   Ms. Palomino presents today for routine follow-up. She reports she is doing well. She is currently on exemestane under the care of Dr. Read. Her most recent follow-up was in December 2023.  She also follows with Dr. Dia who she saw most recently on 3/12/24. Overall, she is doing well.

## 2024-04-14 NOTE — REVIEW OF SYSTEMS
[Joint Pain] : joint pain [Muscle Pain] : muscle pain [Negative] : Endocrine [Chest Pain] : no chest pain [Shortness Of Breath] : no shortness of breath [Cough] : no cough [Muscle Weakness] : no muscle weakness [Gait Disturbance] : no gait disturbance

## 2024-04-14 NOTE — DISEASE MANAGEMENT
[Pathological] : TNM Stage: p [I] : I [FreeTextEntry4] : Invasive Ductal Carcinoma of the Right Breast, ER/MN+ [TTNM] : 1a [NTNM] : 0 [MTNM] : 0

## 2024-04-14 NOTE — PHYSICAL EXAM
[Normal] : oriented to person, place and time, the affect was normal, the mood was normal and not anxious [de-identified] : s/p right partial mastectomy

## 2024-06-26 ENCOUNTER — RESULT REVIEW (OUTPATIENT)
Age: 84
End: 2024-06-26

## 2024-06-26 ENCOUNTER — APPOINTMENT (OUTPATIENT)
Dept: HEMATOLOGY ONCOLOGY | Facility: CLINIC | Age: 84
End: 2024-06-26
Payer: MEDICARE

## 2024-06-26 VITALS
RESPIRATION RATE: 16 BRPM | HEART RATE: 96 BPM | TEMPERATURE: 97.5 F | OXYGEN SATURATION: 96 % | HEIGHT: 60 IN | BODY MASS INDEX: 35.23 KG/M2 | WEIGHT: 179.44 LBS | DIASTOLIC BLOOD PRESSURE: 94 MMHG | SYSTOLIC BLOOD PRESSURE: 154 MMHG

## 2024-06-26 DIAGNOSIS — Z79.811 LONG TERM (CURRENT) USE OF AROMATASE INHIBITORS: ICD-10-CM

## 2024-06-26 DIAGNOSIS — Z17.0 ESTROGEN RECEPTOR POSITIVE STATUS [ER+]: ICD-10-CM

## 2024-06-26 DIAGNOSIS — Z90.11 ACQUIRED ABSENCE OF RIGHT BREAST AND NIPPLE: ICD-10-CM

## 2024-06-26 DIAGNOSIS — C50.911 MALIGNANT NEOPLASM OF UNSPECIFIED SITE OF RIGHT FEMALE BREAST: ICD-10-CM

## 2024-06-26 DIAGNOSIS — C50.411 MALIGNANT NEOPLASM OF UPPER-OUTER QUADRANT OF RIGHT FEMALE BREAST: ICD-10-CM

## 2024-06-26 DIAGNOSIS — M25.50 PAIN IN UNSPECIFIED JOINT: ICD-10-CM

## 2024-06-26 PROCEDURE — 99214 OFFICE O/P EST MOD 30 MIN: CPT | Mod: 25

## 2024-06-26 RX ORDER — SEMAGLUTIDE 1.34 MG/ML
4 INJECTION, SOLUTION SUBCUTANEOUS
Refills: 0 | Status: COMPLETED | COMMUNITY
Start: 2022-07-18 | End: 2024-06-26

## 2024-06-26 RX ORDER — EXEMESTANE 25 MG/1
25 TABLET, FILM COATED ORAL DAILY
Qty: 90 | Refills: 3 | Status: ACTIVE | COMMUNITY
Start: 2022-07-18 | End: 1900-01-01

## 2024-06-26 RX ORDER — SEMAGLUTIDE 2.68 MG/ML
8 INJECTION, SOLUTION SUBCUTANEOUS
Refills: 0 | Status: ACTIVE | COMMUNITY

## 2024-06-26 RX ORDER — MIRABEGRON 25 MG/1
25 TABLET, FILM COATED, EXTENDED RELEASE ORAL
Refills: 0 | Status: ACTIVE | COMMUNITY

## 2024-06-26 NOTE — REVIEW OF SYSTEMS
[Diarrhea: Grade 0] : Diarrhea: Grade 0 [Joint Pain] : joint pain [Joint Stiffness] : joint stiffness [Negative] : Allergic/Immunologic [Dry Eyes] : dryness of the eyes [Constipation] : constipation [Difficulty Walking] : difficulty walking [FreeTextEntry3] : itchy eyes for the last 3 weeks [FreeTextEntry7] : chronic constipation [FreeTextEntry8] : overactive bladder [FreeTextEntry9] : joint pain and stiffness in both hands and knees [de-identified] : increasing in loss of balance for the last 6-8 weeks

## 2024-06-26 NOTE — PHYSICAL EXAM
[Restricted in physically strenuous activity but ambulatory and able to carry out work of a light or sedentary nature] : Status 1- Restricted in physically strenuous activity but ambulatory and able to carry out work of a light or sedentary nature, e.g., light house work, office work [Obese] : obese [Normal] : affect appropriate [de-identified] : hyperpigmentation to right breast and right chest wall fading

## 2024-06-26 NOTE — CONSULT LETTER
[Dear  ___] : Dear  [unfilled], [Consult Letter:] : I had the pleasure of evaluating your patient, [unfilled]. [Please see my note below.] : Please see my note below. [Consult Closing:] : Thank you very much for allowing me to participate in the care of this patient.  If you have any questions, please do not hesitate to contact me. [Sincerely,] : Sincerely, [FreeTextEntry3] : Vivi Read DO, FACAGUILAR, FACP\par  Medical Oncology and Hematology\par  [facility] [place]\par

## 2024-06-26 NOTE — HISTORY OF PRESENT ILLNESS
[de-identified] : Ms. Palomino presents to the office for newly diagnosed right breast cancer with daughters;  ID [id number].\par  Right breast high grade invasive ductal carcinoma\par  ER +, [place] +Her 2 neg\par  KI 67 90%\par  s/p right partial mastectomy\par  left sentinel lymph node dissection- negative\par  \par  Her daughters reports she had a benign mass in left breast 26 years ago.  SHe gets mammogram every year and reports no other abnormalities until most recent one.\par  \par  She reports headaches, breathing aggravated by allergies, chronic constipation, uses cane for arthritis, unsteady when walking\par  s/p multiple falls \par  Her daughters report she has been "foggy" since surgery\par  \par  Age of menarche: 17\par  Age of Menopause: 45 s/p hysterectomy for tumor removal (benign)\par  OCP/HRT denies\par  , Age 24 first birth\par  \par  Smoke: never\par  ETOH: denies\par  Illicit: Denies\par  \par  Personal HIstory:\par  Last colonoscopy about 5 years, polyps, benign\par  Last gyn ~2years ago\par  Last DEXA >5 years ago\par  \par  Family History\par  Paternal Grandmother Breast ca, dx age 55\par  Maternal Aunt Leukemia 50's\par  Maternal Cousin vaginal Ca, dx 65, her daughter just diagnosed with Breast Ca dx 40's [de-identified] : Patient seen and examined and here today for follow up with her daughter. Reports feeling well besides pain in fingers. Tolerating Exemestane Overall feels well Had bone density test scheduled for March at [facility] Cataracts surgery performed in May and June in [place] .

## 2024-06-26 NOTE — ASSESSMENT
[Designated Health Care Proxy] : Designated Health Care Proxy [Name: ___] : Name: [unfilled] [Relationship: ___] : Relationship: [unfilled] [Full Code] : full code [Last Verification Date: ___] : Last Verification Date: [unfilled] [FreeTextEntry1] : # IDC, ER +/OH+/HER2-, (TNM Staging - Prognostically and pathologically IA - pT1c, pN0, Mx) We have reviewed the diagnosis, prognosis and natural history of ER/OH+, HER2- breast CA. We have reviewed the imaging and pathology personally and with the patient. Reviewed NCCN guidelines, she and her daughters express understanding oncotype 42 - does not want chemo Receiving RT currently DEXA scan -3/24 - A/P spine T score -0.5, Left femoral neck T score -0.5, L hip 1.1 - normal bone density Changed from anastrozole 1 mg PO to exemestane, ca++ and vit D - reviewed side effects. continue exemestane She does not want verzenio added mammo/US due  8/24 reviewed notes from Dr. Workman 4/24  and Dr. Mason 3/24. Continue follow up with them reviewed labs from today. labs drawn in office today. cbc wnl.   #arthritis arthritic pain likely aggravated by exemestane but does have + ÓSCAR names of rheumatologists given  #bone density DEXA scan -3/24 - A/P spine T score -0.5, Left femoral neck T score -0.5, L hip 1.1 - normal bone density  #elevated ESR monitor. decreased plan to see rheumatology  #genetic testing reviewed report; VUS RECQL4; told they can contact genetic counselor if they have any questions as this is associated with autosomal recessive disorders  RTC in 6 months with cbc with diff, cmp, ESR/CRP, ca 27-29, b12/fol, irons L facial droop; asymmetry of retraction and pursing on L side L facial droop; asymmetry of retraction and pursing on L side

## 2024-09-10 ENCOUNTER — APPOINTMENT (OUTPATIENT)
Dept: BREAST CENTER | Facility: CLINIC | Age: 84
End: 2024-09-10
Payer: MEDICARE

## 2024-09-10 VITALS
HEART RATE: 89 BPM | SYSTOLIC BLOOD PRESSURE: 149 MMHG | BODY MASS INDEX: 37.11 KG/M2 | OXYGEN SATURATION: 99 % | DIASTOLIC BLOOD PRESSURE: 78 MMHG | HEIGHT: 60 IN | WEIGHT: 189 LBS

## 2024-09-10 DIAGNOSIS — Z80.3 FAMILY HISTORY OF MALIGNANT NEOPLASM OF BREAST: ICD-10-CM

## 2024-09-10 DIAGNOSIS — Z90.11 ACQUIRED ABSENCE OF RIGHT BREAST AND NIPPLE: ICD-10-CM

## 2024-09-10 DIAGNOSIS — Z85.3 PERSONAL HISTORY OF MALIGNANT NEOPLASM OF BREAST: ICD-10-CM

## 2024-09-10 PROCEDURE — 99213 OFFICE O/P EST LOW 20 MIN: CPT

## 2024-09-10 NOTE — HISTORY OF PRESENT ILLNESS
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   Negative margins, Oncotype 42, no chemotherapy, refused [id number], Stage 1a: Mammaprint High Risk H2 Luminal-B: FLEX Trial Radiation and anastrozole and then exemestane  Patient denies any breast masses or bone pain.  Recent bilateral mammogram and ultrasound was unremarkable and dense, BI-RADS 2.     81-year-old postmenopausal  woman with a family history of breast cancer presents after screening mammogram showed dense breast tissue but a follow-up ultrasound showed a 1.4 cm hypoechoic nodule in the periareolar region, 11:00 right breast. Right ultrasound-guided core biopsy revealed poorly differentiated ductal carcinoma that is ER/MS positive HER-2 negative with a Ki-67 of 90%. Patient denies any breast masses or nipple discharge. Her paternal grandmother breast cancer at age 55. An excisional biopsy in the left breast in 1995 that was benign. Patient has never taken hormone replacement therapy.

## 2024-12-17 ENCOUNTER — APPOINTMENT (OUTPATIENT)
Dept: HEMATOLOGY ONCOLOGY | Facility: CLINIC | Age: 84
End: 2024-12-17

## 2024-12-17 ENCOUNTER — NON-APPOINTMENT (OUTPATIENT)
Age: 84
End: 2024-12-17

## 2024-12-18 ENCOUNTER — APPOINTMENT (OUTPATIENT)
Dept: HEMATOLOGY ONCOLOGY | Facility: CLINIC | Age: 84
End: 2024-12-18
Payer: MEDICARE

## 2024-12-18 ENCOUNTER — NON-APPOINTMENT (OUTPATIENT)
Age: 84
End: 2024-12-18

## 2024-12-18 ENCOUNTER — RESULT REVIEW (OUTPATIENT)
Age: 84
End: 2024-12-18

## 2024-12-18 VITALS
TEMPERATURE: 97.2 F | DIASTOLIC BLOOD PRESSURE: 84 MMHG | BODY MASS INDEX: 35.93 KG/M2 | HEIGHT: 60 IN | SYSTOLIC BLOOD PRESSURE: 138 MMHG | HEART RATE: 92 BPM | WEIGHT: 183 LBS | RESPIRATION RATE: 16 BRPM | OXYGEN SATURATION: 96 %

## 2024-12-18 DIAGNOSIS — C50.411 MALIGNANT NEOPLASM OF UPPER-OUTER QUADRANT OF RIGHT FEMALE BREAST: ICD-10-CM

## 2024-12-18 DIAGNOSIS — C50.911 MALIGNANT NEOPLASM OF UNSPECIFIED SITE OF RIGHT FEMALE BREAST: ICD-10-CM

## 2024-12-18 DIAGNOSIS — M25.50 PAIN IN UNSPECIFIED JOINT: ICD-10-CM

## 2024-12-18 PROCEDURE — 99214 OFFICE O/P EST MOD 30 MIN: CPT | Mod: 25

## 2024-12-18 RX ORDER — TIRZEPATIDE 7.5 MG/.5ML
INJECTION, SOLUTION SUBCUTANEOUS
Refills: 0 | Status: ACTIVE | COMMUNITY

## 2025-03-17 ENCOUNTER — NON-APPOINTMENT (OUTPATIENT)
Age: 85
End: 2025-03-17

## 2025-03-21 ENCOUNTER — APPOINTMENT (OUTPATIENT)
Dept: BREAST CENTER | Facility: CLINIC | Age: 85
End: 2025-03-21
Payer: MEDICARE

## 2025-03-21 VITALS — HEIGHT: 60 IN | WEIGHT: 184 LBS | BODY MASS INDEX: 36.12 KG/M2

## 2025-03-21 DIAGNOSIS — Z90.11 ACQUIRED ABSENCE OF RIGHT BREAST AND NIPPLE: ICD-10-CM

## 2025-03-21 DIAGNOSIS — Z85.3 PERSONAL HISTORY OF MALIGNANT NEOPLASM OF BREAST: ICD-10-CM

## 2025-03-21 DIAGNOSIS — R92.30 DENSE BREASTS, UNSPECIFIED: ICD-10-CM

## 2025-03-21 DIAGNOSIS — N64.4 MASTODYNIA: ICD-10-CM

## 2025-03-21 PROCEDURE — 99213 OFFICE O/P EST LOW 20 MIN: CPT

## 2025-04-03 ENCOUNTER — APPOINTMENT (OUTPATIENT)
Dept: RADIATION ONCOLOGY | Facility: CLINIC | Age: 85
End: 2025-04-03
Payer: MEDICARE

## 2025-04-03 VITALS
HEART RATE: 92 BPM | DIASTOLIC BLOOD PRESSURE: 76 MMHG | OXYGEN SATURATION: 98 % | RESPIRATION RATE: 16 BRPM | TEMPERATURE: 97 F | HEIGHT: 62 IN | SYSTOLIC BLOOD PRESSURE: 138 MMHG

## 2025-04-03 PROCEDURE — G2211 COMPLEX E/M VISIT ADD ON: CPT

## 2025-04-03 PROCEDURE — 99214 OFFICE O/P EST MOD 30 MIN: CPT

## 2025-05-14 ENCOUNTER — APPOINTMENT (OUTPATIENT)
Dept: HEMATOLOGY ONCOLOGY | Facility: CLINIC | Age: 85
End: 2025-05-14
Payer: MEDICARE

## 2025-05-14 ENCOUNTER — RESULT REVIEW (OUTPATIENT)
Age: 85
End: 2025-05-14

## 2025-05-14 VITALS
HEART RATE: 86 BPM | HEIGHT: 62 IN | OXYGEN SATURATION: 96 % | DIASTOLIC BLOOD PRESSURE: 80 MMHG | BODY MASS INDEX: 34.47 KG/M2 | RESPIRATION RATE: 16 BRPM | WEIGHT: 187.31 LBS | TEMPERATURE: 97 F | SYSTOLIC BLOOD PRESSURE: 143 MMHG

## 2025-05-14 DIAGNOSIS — C50.411 MALIGNANT NEOPLASM OF UPPER-OUTER QUADRANT OF RIGHT FEMALE BREAST: ICD-10-CM

## 2025-05-14 PROCEDURE — 99214 OFFICE O/P EST MOD 30 MIN: CPT | Mod: 25
